# Patient Record
Sex: FEMALE | Race: WHITE | NOT HISPANIC OR LATINO | Employment: FULL TIME | URBAN - METROPOLITAN AREA
[De-identification: names, ages, dates, MRNs, and addresses within clinical notes are randomized per-mention and may not be internally consistent; named-entity substitution may affect disease eponyms.]

---

## 2017-02-06 ENCOUNTER — PATIENT MESSAGE (OUTPATIENT)
Dept: OBSTETRICS AND GYNECOLOGY | Facility: CLINIC | Age: 27
End: 2017-02-06

## 2017-03-12 ENCOUNTER — PATIENT MESSAGE (OUTPATIENT)
Dept: OBSTETRICS AND GYNECOLOGY | Facility: CLINIC | Age: 27
End: 2017-03-12

## 2017-05-10 ENCOUNTER — NURSE TRIAGE (OUTPATIENT)
Dept: ADMINISTRATIVE | Facility: CLINIC | Age: 27
End: 2017-05-10

## 2017-05-10 ENCOUNTER — TELEPHONE (OUTPATIENT)
Dept: OBSTETRICS AND GYNECOLOGY | Facility: CLINIC | Age: 27
End: 2017-05-10

## 2017-05-10 NOTE — TELEPHONE ENCOUNTER
Reason for Disposition   Normal menstrual flow    Protocols used: ST VAGINAL BLEEDING - [id number]  Ms. Ayala states she began her first period in 6 months after having her baby. She states bleeding is heavy. Patient is changing pad one pad every 1-2 hours. She would like to know if that is normal.

## 2018-07-30 ENCOUNTER — OFFICE VISIT (OUTPATIENT)
Dept: OBSTETRICS AND GYNECOLOGY | Facility: CLINIC | Age: 28
End: 2018-07-30
Payer: COMMERCIAL

## 2018-07-30 VITALS
BODY MASS INDEX: 35.68 KG/M2 | HEIGHT: 66 IN | WEIGHT: 222 LBS | DIASTOLIC BLOOD PRESSURE: 78 MMHG | SYSTOLIC BLOOD PRESSURE: 114 MMHG

## 2018-07-30 DIAGNOSIS — R19.8 RLQ FULLNESS: ICD-10-CM

## 2018-07-30 DIAGNOSIS — R10.2 FEMALE PELVIC PAIN: ICD-10-CM

## 2018-07-30 DIAGNOSIS — Z12.4 PAP SMEAR FOR CERVICAL CANCER SCREENING: Primary | ICD-10-CM

## 2018-07-30 PROCEDURE — 99395 PREV VISIT EST AGE 18-39: CPT | Mod: S$GLB,,, | Performed by: OBSTETRICS & GYNECOLOGY

## 2018-07-30 PROCEDURE — 99999 PR PBB SHADOW E&M-EST. PATIENT-LVL III: CPT | Mod: PBBFAC,,, | Performed by: OBSTETRICS & GYNECOLOGY

## 2018-07-30 PROCEDURE — 87624 HPV HI-RISK TYP POOLED RSLT: CPT

## 2018-07-30 PROCEDURE — 88175 CYTOPATH C/V AUTO FLUID REDO: CPT

## 2018-07-30 RX ORDER — AMLODIPINE BESYLATE 5 MG/1
TABLET ORAL
COMMUNITY
Start: 2018-03-01 | End: 2018-11-06

## 2018-07-30 NOTE — PROGRESS NOTES
Chief Complaint   Patient presents with    Annual Exam       History and Physical:  Patient's last menstrual period was 2018 (exact date).       MORGAN YAO is a 28 y.o.  female who presents today for her routine annual GYN exam. The patient has no Gynecology complaints today. rlq pains on and off and with intercourse, irregualr menses every 1-3 months. Counseled, trying to conceive, provera challenge.       Allergies:   Review of patient's allergies indicates:   Allergen Reactions    No known allergies        Past Medical History:   Diagnosis Date    Abnormal Pap smear     Asthma     allergy induced    Ovarian cyst     Seasonal allergies        Past Surgical History:   Procedure Laterality Date     SECTION         MEDS:   Current Outpatient Prescriptions on File Prior to Visit   Medication Sig Dispense Refill    albuterol (PROAIR HFA) 90 mcg/Actuation inhaler Every 4 hours PRN      PRENATAL VIT W-CA,FE,FA,<1 MG, (PRENATAL VITAMIN ORAL) Take by mouth.      [DISCONTINUED] blood sugar diagnostic Strp 1 strip by Misc.(Non-Drug; Combo Route) route 4 (four) times daily. 100 each 3    [DISCONTINUED] blood-glucose meter (FREESTYLE SYSTEM KIT) kit Use as instructed 1 each 0    [DISCONTINUED] lancets (FREESTYLE LANCETS) 28 gauge Misc 1 lancet by Misc.(Non-Drug; Combo Route) route 4 (four) times daily. 1000 each 3    [DISCONTINUED] methyldopa (ALDOMET) 250 MG tablet Take 1 tablet (250 mg total) by mouth 3 (three) times daily. 90 tablet 2    [DISCONTINUED] norethindrone (MICRONOR) 0.35 mg tablet Take 1 tablet (0.35 mg total) by mouth once daily. 28 tablet 12     No current facility-administered medications on file prior to visit.        OB History      Para Term  AB Living    1 1 1     1    SAB TAB Ectopic Multiple Live Births            1          Social History     Social History    Marital status: Single     Spouse name: N/A    Number of children: N/A    Years of  "education: N/A     Occupational History    Not on file.     Social History Main Topics    Smoking status: Never Smoker    Smokeless tobacco: Never Used    Alcohol use No      Comment: socially     Drug use: No    Sexual activity: Yes     Partners: Male     Other Topics Concern    Not on file     Social History Narrative    No narrative on file       Family History   Problem Relation Age of Onset    Diabetes Paternal Grandmother     Heart murmur Maternal Grandfather     Gestational diabetes Mother     Breast cancer Maternal Aunt     Breast cancer Maternal Grandmother          Past medical and surgical history reviewed.   I have reviewed the patient's medical history in detail and updated the computerized patient record.        Review of System:   General: no chills, fever, night sweats, weight gain or weight loss  Psychological: no depression or suicidal ideation  Breasts: no new or changing breast lumps, nipple discharge or masses.  Respiratory: no cough, shortness of breath, or wheezing  Cardiovascular: no chest pain or dyspnea on exertion  Gastrointestinal: no abdominal pain, change in bowel habits, or black or bloody stools  Genito-Urinary: no incontinence, urinary frequency/urgency or vulvar/vaginal symptoms.   Musculoskeletal: no gait disturbance or muscular weakness      Physical Exam:   /78   Ht 5' 6" (1.676 m)   Wt 100.7 kg (222 lb 0.1 oz)   LMP 06/24/2018 (Exact Date)   Breastfeeding? No   BMI 35.83 kg/m²   Constitutional: She is oriented to person, place, and time. She appears well-developed and well-nourished. No distress.   HENT:   Head: Normocephalic and atraumatic.   Eyes: Conjunctivae and EOM are normal. No scleral icterus.   Neck: Normal range of motion. Neck supple. No tracheal deviation present.   Cardiovascular: Normal rate.    Pulmonary/Chest: Effort normal. No respiratory distress. She exhibits no tenderness.  Breasts: are symmetrical.   Right breast exhibits no inverted " nipple, no mass, no nipple discharge, no skin change and no tenderness.   Left breast exhibits no inverted nipple, no mass, no nipple discharge, no skin change and no tenderness.  Abdominal: Soft. She exhibits no distension and no mass. There is no tenderness. There is no rebound and no guarding.   Genitourinary:    External rectal exam shows no thrombosed external hemorrhoids.    Pelvic exam was performed with patient supine.   No labial fusion.   There is no rash, lesion or injury on the right labia.   There is no rash, lesion or injury on the left labia.   No bleeding and no signs of injury around the vaginal introitus, urethra is without lesions and well supported. The cervix is visualized with no discharge, lesions or friability.   No vaginal discharge found.    No significant Cystocele, Enterocele or rectocele, and uterus well supported.   Bimanual exam:   The urethra is normal to palpation and there are no palpable vaginal wall masses.   Uterus is not deviated, not enlarged, not fixed, normal shape and not tender.   Cervix exhibits no motion tenderness.    Right adnexum displays no mass and fullness.   Left adnexum displays no mass and no tenderness.  Musculoskeletal: Normal range of motion.   Lymphadenopathy: No inguinal adenopathy present.   Neurological: She is alert and oriented to person, place, and time. Coordination normal.   Skin: Skin is warm and dry. She is not diaphoretic.   Psychiatric: She has a normal mood and affect.      Assessment:   Normal annual GYN exam  1. Pap smear for cervical cancer screening  Liquid-based pap smear, screening    HPV High Risk Genotypes, PCR   2. RLQ fullness  US Pelvis Complete Non OB   3. Female pelvic pain  US Pelvis Complete Non OB   RLQ pains and fullness,  Irregular menses    Plan:   PAP, ultrasound  Provera challenge.   Follow up in 3 months if continued problems.  Patient informed will be contacted with results within 2 weeks. Encouraged to please call back or  email if she has not heard from us by then.

## 2018-08-01 ENCOUNTER — TELEPHONE (OUTPATIENT)
Dept: OBSTETRICS AND GYNECOLOGY | Facility: CLINIC | Age: 28
End: 2018-08-01

## 2018-08-01 ENCOUNTER — HOSPITAL ENCOUNTER (OUTPATIENT)
Dept: RADIOLOGY | Facility: HOSPITAL | Age: 28
Discharge: HOME | End: 2018-08-01
Attending: OBSTETRICS & GYNECOLOGY
Payer: COMMERCIAL

## 2018-08-01 DIAGNOSIS — R19.8 RLQ FULLNESS: ICD-10-CM

## 2018-08-01 DIAGNOSIS — R10.2 FEMALE PELVIC PAIN: ICD-10-CM

## 2018-08-01 PROCEDURE — 76856 US EXAM PELVIC COMPLETE: CPT | Mod: 26,,, | Performed by: RADIOLOGY

## 2018-08-01 PROCEDURE — 76830 TRANSVAGINAL US NON-OB: CPT | Mod: 26,,, | Performed by: RADIOLOGY

## 2018-08-01 PROCEDURE — 76830 TRANSVAGINAL US NON-OB: CPT | Mod: TC,PN

## 2018-08-01 NOTE — TELEPHONE ENCOUNTER
----- Message from Sunshine Knox sent at 8/1/2018 11:23 AM CDT -----  Contact: self  Patient is scheduled at 1PM for a vaginal ultrasound and just started her cycle and wants to know if she can still have the ultrasound.  Call back at [phone].  Thank you!

## 2018-08-03 LAB
HPV HR 12 DNA CVX QL NAA+PROBE: NEGATIVE
HPV16 AG SPEC QL: NEGATIVE
HPV18 DNA SPEC QL NAA+PROBE: NEGATIVE

## 2018-11-03 ENCOUNTER — PATIENT MESSAGE (OUTPATIENT)
Dept: OBSTETRICS AND GYNECOLOGY | Facility: CLINIC | Age: 28
End: 2018-11-03

## 2018-11-06 ENCOUNTER — LAB VISIT (OUTPATIENT)
Dept: LAB | Facility: HOSPITAL | Age: 28
End: 2018-11-06
Attending: OBSTETRICS & GYNECOLOGY
Payer: COMMERCIAL

## 2018-11-06 ENCOUNTER — OFFICE VISIT (OUTPATIENT)
Dept: OBSTETRICS AND GYNECOLOGY | Facility: CLINIC | Age: 28
End: 2018-11-06
Payer: COMMERCIAL

## 2018-11-06 VITALS
WEIGHT: 224.19 LBS | BODY MASS INDEX: 36.03 KG/M2 | SYSTOLIC BLOOD PRESSURE: 124 MMHG | DIASTOLIC BLOOD PRESSURE: 84 MMHG | HEIGHT: 66 IN

## 2018-11-06 DIAGNOSIS — N91.2 ABSENT MENSES: Primary | ICD-10-CM

## 2018-11-06 DIAGNOSIS — N91.2 ABSENT MENSES: ICD-10-CM

## 2018-11-06 DIAGNOSIS — Z34.90 PREGNANCY AT EARLY STAGE: ICD-10-CM

## 2018-11-06 LAB
B-HCG UR QL: POSITIVE
CTP QC/QA: YES
HCG INTACT+B SERPL-ACNC: 181 MIU/ML
PROGEST SERPL-MCNC: 13.3 NG/ML

## 2018-11-06 PROCEDURE — 99213 OFFICE O/P EST LOW 20 MIN: CPT | Mod: S$GLB,,, | Performed by: OBSTETRICS & GYNECOLOGY

## 2018-11-06 PROCEDURE — 3008F BODY MASS INDEX DOCD: CPT | Mod: CPTII,S$GLB,, | Performed by: OBSTETRICS & GYNECOLOGY

## 2018-11-06 PROCEDURE — 99999 PR PBB SHADOW E&M-EST. PATIENT-LVL III: CPT | Mod: PBBFAC,,, | Performed by: OBSTETRICS & GYNECOLOGY

## 2018-11-06 PROCEDURE — 84702 CHORIONIC GONADOTROPIN TEST: CPT

## 2018-11-06 PROCEDURE — 84144 ASSAY OF PROGESTERONE: CPT

## 2018-11-06 PROCEDURE — 81025 URINE PREGNANCY TEST: CPT | Mod: S$GLB,,, | Performed by: OBSTETRICS & GYNECOLOGY

## 2018-11-06 PROCEDURE — 36415 COLL VENOUS BLD VENIPUNCTURE: CPT | Mod: PO

## 2018-11-06 RX ORDER — AMLODIPINE AND BENAZEPRIL HYDROCHLORIDE 5; 10 MG/1; MG/1
1 CAPSULE ORAL DAILY
COMMUNITY
End: 2018-11-21

## 2018-11-06 RX ORDER — METHYLDOPA 250 MG/1
250 TABLET, FILM COATED ORAL EVERY 12 HOURS
Qty: 60 TABLET | Refills: 6 | Status: SHIPPED | OUTPATIENT
Start: 2018-11-06 | End: 2019-05-17 | Stop reason: SDUPTHER

## 2018-11-06 NOTE — PROGRESS NOTES
"Chief Complaint   Patient presents with    confirm pregnancy       History of Present Illness   28 y.o.  female patient presents today for early pregnancy, faint positive UPT - counseled. Early pregnancy rpecautions discussed.     Past medical and surgical history reviewed.   I have reviewed the patient's medical history in detail and updated the computerized patient record.    Review of patient's allergies indicates:   Allergen Reactions    No known allergies          Review of Systems - Negative except HPI  GEN ROS: negative for - chills or fever  Breast ROS: negative for breast lumps  Genito-Urinary ROS: no dysuria, trouble voiding, or hematuria      Physical Examination:  /84   Ht 5' 6" (1.676 m)   Wt 101.7 kg (224 lb 3.3 oz)   LMP 09/21/2018 (Exact Date)   Breastfeeding? Yes   BMI 36.19 kg/m²    deferred      Assessment:  htn - need to cage meds  1. Absent menses  POCT urine pregnancy    hCG, quantitative    Progesterone   2. Pregnancy at early stage         [facility]  Early ob labs, follow up 2 weeks for u.s to confirm viability.   Patient informed will be contacted with results within 2 weeks. Encouraged to please call back or email if she has not heard from us by then.        "

## 2018-11-07 ENCOUNTER — PATIENT MESSAGE (OUTPATIENT)
Dept: OBSTETRICS AND GYNECOLOGY | Facility: CLINIC | Age: 28
End: 2018-11-07

## 2018-11-07 DIAGNOSIS — Z34.90 EARLY STAGE OF PREGNANCY: Primary | ICD-10-CM

## 2018-11-08 ENCOUNTER — LAB VISIT (OUTPATIENT)
Dept: LAB | Facility: HOSPITAL | Age: 28
End: 2018-11-08
Attending: OBSTETRICS & GYNECOLOGY
Payer: COMMERCIAL

## 2018-11-08 DIAGNOSIS — Z34.90 EARLY STAGE OF PREGNANCY: ICD-10-CM

## 2018-11-08 LAB — HCG INTACT+B SERPL-ACNC: 319 MIU/ML

## 2018-11-08 PROCEDURE — 84702 CHORIONIC GONADOTROPIN TEST: CPT

## 2018-11-08 PROCEDURE — 36415 COLL VENOUS BLD VENIPUNCTURE: CPT | Mod: PO

## 2018-11-09 ENCOUNTER — PATIENT MESSAGE (OUTPATIENT)
Dept: OBSTETRICS AND GYNECOLOGY | Facility: CLINIC | Age: 28
End: 2018-11-09

## 2018-11-21 ENCOUNTER — ROUTINE PRENATAL (OUTPATIENT)
Dept: OBSTETRICS AND GYNECOLOGY | Facility: CLINIC | Age: 28
End: 2018-11-21
Payer: COMMERCIAL

## 2018-11-21 VITALS
WEIGHT: 226.88 LBS | DIASTOLIC BLOOD PRESSURE: 74 MMHG | BODY MASS INDEX: 36.62 KG/M2 | SYSTOLIC BLOOD PRESSURE: 130 MMHG

## 2018-11-21 DIAGNOSIS — Z34.90 EARLY STAGE OF PREGNANCY: Primary | ICD-10-CM

## 2018-11-21 DIAGNOSIS — O36.8390 UNABLE TO HEAR FETAL HEART TONES AS REASON FOR ULTRASOUND SCAN: ICD-10-CM

## 2018-11-21 PROCEDURE — 87491 CHLMYD TRACH DNA AMP PROBE: CPT

## 2018-11-21 PROCEDURE — 76817 TRANSVAGINAL US OBSTETRIC: CPT | Mod: S$GLB,,, | Performed by: OBSTETRICS & GYNECOLOGY

## 2018-11-21 PROCEDURE — 99999 PR PBB SHADOW E&M-EST. PATIENT-LVL III: CPT | Mod: PBBFAC,,, | Performed by: OBSTETRICS & GYNECOLOGY

## 2018-11-21 PROCEDURE — 0502F SUBSEQUENT PRENATAL CARE: CPT | Mod: S$GLB,,, | Performed by: OBSTETRICS & GYNECOLOGY

## 2018-11-21 PROCEDURE — 3008F BODY MASS INDEX DOCD: CPT | Mod: CPTII,S$GLB,, | Performed by: OBSTETRICS & GYNECOLOGY

## 2018-11-21 PROCEDURE — 87086 URINE CULTURE/COLONY COUNT: CPT

## 2018-11-21 NOTE — PROCEDURES
Procedures     ULTRASOUND:   Bedside Ultrasound Findings      Narrative     Ultrasound transvaginal performed with the 6.5mhz probe in the usual fashion.      The uterus appears normal.      Viable parikh intrauterine pregnancy was seen, yolk sac was seen  Crown-rump length = 4 mm with flicker, consistent with 6w1d and EDC 07/16/2019.   The right ovary is not visualized, and the left ovary is not visualized.   No free fluid in cul-de-sac or adnexal pathology seen      Impression       1.  single viable intrauterine pregnancy seen today  2. CRL is consistent with 6w1d today

## 2018-11-21 NOTE — PROGRESS NOTES
Complaints today: mild cramps, unable to hear FHT with doppler. Irregular cycles, positive UPT    /74   Wt 102.9 kg (226 lb 13.7 oz)   LMP 2018 (Exact Date)   BMI 36.62 kg/m²     28 y.o., at 8w5d by Estimated Date of Delivery: 19  Patient Active Problem List   Diagnosis    ASCUS (atypical squamous cells of undetermined significance) on Pap smear    Encounter for supervision of normal first pregnancy in first trimester    Diet controlled gestational diabetes mellitus in third trimester     OB History    Para Term  AB Living   2 1 1     1   SAB TAB Ectopic Multiple Live Births           1      # Outcome Date GA Lbr Kelby/2nd Weight Sex Delivery Anes PTL Lv   2 Current            1 Term 16 40w0d  3.912 kg (8 lb 10 oz) F CS-LTranv EPI N JEFFREY      Complications: Failure to Progress in First Stage          Dating reviewed    Allergies and problem list reviewed and updated    Medical and surgical history reviewed    Prenatal labs reviewed and updated    Physical Exam:  ABD: soft, gravid, nontender    Assessment:  Early pregnancy- u/s for dates    Plan:    follow up 3 Weeks, bleeding/pain precautions, repeat u/s for viability at follow up.

## 2018-11-22 LAB
C TRACH DNA SPEC QL NAA+PROBE: NOT DETECTED
N GONORRHOEA DNA SPEC QL NAA+PROBE: NOT DETECTED

## 2018-11-23 LAB
BACTERIA UR CULT: NORMAL
BACTERIA UR CULT: NORMAL

## 2018-12-10 ENCOUNTER — PATIENT MESSAGE (OUTPATIENT)
Dept: OBSTETRICS AND GYNECOLOGY | Facility: CLINIC | Age: 28
End: 2018-12-10

## 2018-12-12 ENCOUNTER — PATIENT MESSAGE (OUTPATIENT)
Dept: OBSTETRICS AND GYNECOLOGY | Facility: CLINIC | Age: 28
End: 2018-12-12

## 2018-12-12 ENCOUNTER — ROUTINE PRENATAL (OUTPATIENT)
Dept: OBSTETRICS AND GYNECOLOGY | Facility: CLINIC | Age: 28
End: 2018-12-12
Payer: COMMERCIAL

## 2018-12-12 VITALS
BODY MASS INDEX: 36.83 KG/M2 | DIASTOLIC BLOOD PRESSURE: 72 MMHG | SYSTOLIC BLOOD PRESSURE: 120 MMHG | WEIGHT: 228.19 LBS

## 2018-12-12 DIAGNOSIS — Z3A.11 11 WEEKS GESTATION OF PREGNANCY: Primary | ICD-10-CM

## 2018-12-12 DIAGNOSIS — O36.8390 UNABLE TO HEAR FETAL HEART TONES AS REASON FOR ULTRASOUND SCAN: ICD-10-CM

## 2018-12-12 DIAGNOSIS — Z34.90 EARLY STAGE OF PREGNANCY: ICD-10-CM

## 2018-12-12 PROCEDURE — 3008F BODY MASS INDEX DOCD: CPT | Mod: CPTII,S$GLB,, | Performed by: OBSTETRICS & GYNECOLOGY

## 2018-12-12 PROCEDURE — 99213 OFFICE O/P EST LOW 20 MIN: CPT | Mod: 25,S$GLB,, | Performed by: OBSTETRICS & GYNECOLOGY

## 2018-12-12 PROCEDURE — 99999 PR PBB SHADOW E&M-EST. PATIENT-LVL II: CPT | Mod: PBBFAC,,, | Performed by: OBSTETRICS & GYNECOLOGY

## 2018-12-12 PROCEDURE — 76817 TRANSVAGINAL US OBSTETRIC: CPT | Mod: S$GLB,,, | Performed by: OBSTETRICS & GYNECOLOGY

## 2018-12-12 NOTE — PROGRESS NOTES
Complaints today: none    /72   Wt 103.5 kg (228 lb 2.8 oz)   LMP 2018 (Exact Date)   BMI 36.83 kg/m²     28 y.o., at 11w5d by Estimated Date of Delivery: 19  Patient Active Problem List   Diagnosis    ASCUS (atypical squamous cells of undetermined significance) on Pap smear    Encounter for supervision of normal first pregnancy in first trimester    Diet controlled gestational diabetes mellitus in third trimester     OB History    Para Term  AB Living   2 1 1     1   SAB TAB Ectopic Multiple Live Births           1      # Outcome Date GA Lbr Kelby/2nd Weight Sex Delivery Anes PTL Lv   2 Current            1 Term 16 40w0d  3.912 kg (8 lb 10 oz) F CS-LTranv EPI N JEFFREY      Complications: Failure to Progress in First Stage          Dating reviewed    Allergies and problem list reviewed and updated    Medical and surgical history reviewed    Prenatal labs reviewed and updated    Physical Exam:  ABD: soft, gravid, nontender  Unable to hear FHT with doppler    Assessment:  9 weeks, u/s todaydoing well    Plan:    follow up 3 Weeks, bleeding/pain precautions prenatal labs today.

## 2018-12-12 NOTE — PROCEDURES
Procedures     ULTRASOUND:   Bedside Ultrasound Findings      Narrative     Ultrasound transvaginal performed with the 6.5mhz probe in the usual fashion.      The uterus appears normal.      Viable parikh intrauterine pregnancy was seen, yolk sac was seen  Crown-rump length = 26mm mm with flicker, consistent with 9w4d and EDC 07/13/2019.   The right ovary is not visualized, and the left ovary is not visualized.     No free fluid in cul-de-sac or adnexal pathology seen      Impression       1.  normal single viable intrauterine pregnancy  2. Crown rump length consistent with 9w4d today  3. 2 weeks aerlier than reported menstrual dates.

## 2018-12-13 ENCOUNTER — PATIENT MESSAGE (OUTPATIENT)
Dept: OBSTETRICS AND GYNECOLOGY | Facility: CLINIC | Age: 28
End: 2018-12-13

## 2018-12-27 ENCOUNTER — LAB VISIT (OUTPATIENT)
Dept: LAB | Facility: HOSPITAL | Age: 28
End: 2018-12-27
Attending: OBSTETRICS & GYNECOLOGY
Payer: COMMERCIAL

## 2018-12-27 DIAGNOSIS — Z34.90 EARLY STAGE OF PREGNANCY: ICD-10-CM

## 2018-12-27 LAB
ABO + RH BLD: NORMAL
BASOPHILS # BLD AUTO: 0.07 K/UL
BASOPHILS NFR BLD: 0.8 %
BLD GP AB SCN CELLS X3 SERPL QL: NORMAL
DIFFERENTIAL METHOD: ABNORMAL
EOSINOPHIL # BLD AUTO: 0.3 K/UL
EOSINOPHIL NFR BLD: 3.9 %
ERYTHROCYTE [DISTWIDTH] IN BLOOD BY AUTOMATED COUNT: 13.2 %
HCT VFR BLD AUTO: 40.9 %
HGB BLD-MCNC: 12.8 G/DL
IMM GRANULOCYTES # BLD AUTO: 0.04 K/UL
IMM GRANULOCYTES NFR BLD AUTO: 0.5 %
LYMPHOCYTES # BLD AUTO: 1.4 K/UL
LYMPHOCYTES NFR BLD: 17 %
MCH RBC QN AUTO: 27.8 PG
MCHC RBC AUTO-ENTMCNC: 31.3 G/DL
MCV RBC AUTO: 89 FL
MONOCYTES # BLD AUTO: 0.3 K/UL
MONOCYTES NFR BLD: 3.1 %
NEUTROPHILS # BLD AUTO: 6.3 K/UL
NEUTROPHILS NFR BLD: 74.7 %
NRBC BLD-RTO: 0 /100 WBC
PLATELET # BLD AUTO: 314 K/UL
PMV BLD AUTO: 10.7 FL
RBC # BLD AUTO: 4.6 M/UL
TSH SERPL DL<=0.005 MIU/L-ACNC: 1 UIU/ML
WBC # BLD AUTO: 8.47 K/UL

## 2018-12-27 PROCEDURE — 86762 RUBELLA ANTIBODY: CPT

## 2018-12-27 PROCEDURE — 86703 HIV-1/HIV-2 1 RESULT ANTBDY: CPT

## 2018-12-27 PROCEDURE — 85025 COMPLETE CBC W/AUTO DIFF WBC: CPT

## 2018-12-27 PROCEDURE — 87340 HEPATITIS B SURFACE AG IA: CPT

## 2018-12-27 PROCEDURE — 86592 SYPHILIS TEST NON-TREP QUAL: CPT

## 2018-12-27 PROCEDURE — 84443 ASSAY THYROID STIM HORMONE: CPT

## 2018-12-27 PROCEDURE — 86850 RBC ANTIBODY SCREEN: CPT

## 2018-12-27 PROCEDURE — 36415 COLL VENOUS BLD VENIPUNCTURE: CPT | Mod: PO

## 2018-12-28 LAB
HBV SURFACE AG SERPL QL IA: NEGATIVE
HIV 1+2 AB+HIV1 P24 AG SERPL QL IA: NEGATIVE
RPR SER QL: NORMAL
RUBV IGG SER-ACNC: 16.1 IU/ML
RUBV IGG SER-IMP: REACTIVE

## 2019-01-02 ENCOUNTER — ROUTINE PRENATAL (OUTPATIENT)
Dept: OBSTETRICS AND GYNECOLOGY | Facility: CLINIC | Age: 29
End: 2019-01-02
Attending: OBSTETRICS & GYNECOLOGY
Payer: COMMERCIAL

## 2019-01-02 VITALS
SYSTOLIC BLOOD PRESSURE: 122 MMHG | BODY MASS INDEX: 36.76 KG/M2 | WEIGHT: 227.75 LBS | DIASTOLIC BLOOD PRESSURE: 66 MMHG

## 2019-01-02 DIAGNOSIS — Z3A.14 14 WEEKS GESTATION OF PREGNANCY: Primary | ICD-10-CM

## 2019-01-02 LAB
BILIRUB SERPL-MCNC: NORMAL MG/DL
BLOOD URINE, POC: NORMAL
COLOR, POC UA: YELLOW
GLUCOSE UR QL STRIP: NORMAL
KETONES UR QL STRIP: NORMAL
LEUKOCYTE ESTERASE URINE, POC: NORMAL
NITRITE, POC UA: NORMAL
PH, POC UA: NORMAL
PROTEIN, POC: NORMAL
SPECIFIC GRAVITY, POC UA: 1.01
UROBILINOGEN, POC UA: NORMAL

## 2019-01-02 PROCEDURE — 0502F PR SUBSEQUENT PRENATAL CARE: ICD-10-PCS | Mod: S$GLB,,, | Performed by: OBSTETRICS & GYNECOLOGY

## 2019-01-02 PROCEDURE — 0502F SUBSEQUENT PRENATAL CARE: CPT | Mod: S$GLB,,, | Performed by: OBSTETRICS & GYNECOLOGY

## 2019-01-02 PROCEDURE — 99999 PR PBB SHADOW E&M-EST. PATIENT-LVL III: ICD-10-PCS | Mod: PBBFAC,,, | Performed by: OBSTETRICS & GYNECOLOGY

## 2019-01-02 PROCEDURE — 99999 PR PBB SHADOW E&M-EST. PATIENT-LVL III: CPT | Mod: PBBFAC,,, | Performed by: OBSTETRICS & GYNECOLOGY

## 2019-01-02 NOTE — PROGRESS NOTES
Complaints today: none    /66   Wt 103.3 kg (227 lb 11.8 oz)   LMP 2018 (Exact Date)   BMI 36.76 kg/m²     28 y.o., at 12w4d by Estimated Date of Delivery: 19  Patient Active Problem List   Diagnosis    ASCUS (atypical squamous cells of undetermined significance) on Pap smear    Encounter for supervision of normal first pregnancy in first trimester    Diet controlled gestational diabetes mellitus in third trimester     OB History    Para Term  AB Living   2 1 1     1   SAB TAB Ectopic Multiple Live Births           1      # Outcome Date GA Lbr Kelby/2nd Weight Sex Delivery Anes PTL Lv   2 Current            1 Term 16 40w0d  3.912 kg (8 lb 10 oz) F CS-LTranv EPI N JEFFREY      Complications: Failure to Progress in First Stage          Dating reviewed    Allergies and problem list reviewed and updated    Medical and surgical history reviewed    Prenatal labs reviewed and updated    Physical Exam:  ABD: soft, gravid, nontender    Assessment:  12 weeks, doing well  FHT with u/s today    Plan:   Counseled quad screen   follow up 4 Weeks, bleeding/pain precautions

## 2019-01-29 ENCOUNTER — PATIENT MESSAGE (OUTPATIENT)
Dept: OBSTETRICS AND GYNECOLOGY | Facility: CLINIC | Age: 29
End: 2019-01-29

## 2019-01-30 ENCOUNTER — ROUTINE PRENATAL (OUTPATIENT)
Dept: OBSTETRICS AND GYNECOLOGY | Facility: CLINIC | Age: 29
End: 2019-01-30
Attending: OBSTETRICS & GYNECOLOGY
Payer: COMMERCIAL

## 2019-01-30 ENCOUNTER — LAB VISIT (OUTPATIENT)
Dept: LAB | Facility: HOSPITAL | Age: 29
End: 2019-01-30
Attending: OBSTETRICS & GYNECOLOGY
Payer: COMMERCIAL

## 2019-01-30 VITALS
WEIGHT: 233.69 LBS | BODY MASS INDEX: 37.72 KG/M2 | SYSTOLIC BLOOD PRESSURE: 142 MMHG | DIASTOLIC BLOOD PRESSURE: 56 MMHG

## 2019-01-30 DIAGNOSIS — Z3A.16 16 WEEKS GESTATION OF PREGNANCY: Primary | ICD-10-CM

## 2019-01-30 DIAGNOSIS — Z3A.16 16 WEEKS GESTATION OF PREGNANCY: ICD-10-CM

## 2019-01-30 LAB
BILIRUB SERPL-MCNC: NORMAL MG/DL
BLOOD URINE, POC: NORMAL
COLOR, POC UA: YELLOW
GLUCOSE UR QL STRIP: NORMAL
KETONES UR QL STRIP: NORMAL
LEUKOCYTE ESTERASE URINE, POC: NORMAL
NITRITE, POC UA: NORMAL
PH, POC UA: 7
PROTEIN, POC: NORMAL
SPECIFIC GRAVITY, POC UA: NORMAL
UROBILINOGEN, POC UA: NORMAL

## 2019-01-30 PROCEDURE — 99999 PR PBB SHADOW E&M-EST. PATIENT-LVL II: CPT | Mod: PBBFAC,,, | Performed by: OBSTETRICS & GYNECOLOGY

## 2019-01-30 PROCEDURE — 0502F PR SUBSEQUENT PRENATAL CARE: ICD-10-PCS | Mod: S$GLB,,, | Performed by: OBSTETRICS & GYNECOLOGY

## 2019-01-30 PROCEDURE — 0502F SUBSEQUENT PRENATAL CARE: CPT | Mod: S$GLB,,, | Performed by: OBSTETRICS & GYNECOLOGY

## 2019-01-30 PROCEDURE — 36415 COLL VENOUS BLD VENIPUNCTURE: CPT | Mod: PO

## 2019-01-30 PROCEDURE — 99999 PR PBB SHADOW E&M-EST. PATIENT-LVL II: ICD-10-PCS | Mod: PBBFAC,,, | Performed by: OBSTETRICS & GYNECOLOGY

## 2019-01-30 PROCEDURE — 81511 FTL CGEN ABNOR FOUR ANAL: CPT

## 2019-01-30 NOTE — PROGRESS NOTES
Complaints today: none, BPs at home 130s / 60s on aldomet BID  Recommend increase to TID aldomet and start baby ASA.     BP (!) 180/78   Wt 106 kg (233 lb 11 oz)   LMP 2018 (Exact Date)   BMI 37.72 kg/m²     28 y.o., at 16w4d by Estimated Date of Delivery: 19  Patient Active Problem List   Diagnosis    ASCUS (atypical squamous cells of undetermined significance) on Pap smear    Encounter for supervision of normal first pregnancy in first trimester    Diet controlled gestational diabetes mellitus in third trimester     OB History    Para Term  AB Living   2 1 1     1   SAB TAB Ectopic Multiple Live Births           1      # Outcome Date GA Lbr Kelby/2nd Weight Sex Delivery Anes PTL Lv   2 Current            1 Term 16 40w0d  3.912 kg (8 lb 10 oz) F CS-LTranv EPI N JEFFREY      Complications: Failure to Progress in First Stage          Dating reviewed    Allergies and problem list reviewed and updated    Medical and surgical history reviewed    Prenatal labs reviewed and updated    Physical Exam:  ABD: soft, gravid, nontender    Assessment:  16 weeks, doing well on aldomet - elevated BPS      PLAN:  Increase to TID 250mg and baby ASA  U/s at follow up  Quad screen today    follow up 3 Weeks, bleeding/pain , HTN precautions.

## 2019-02-04 LAB
# FETUSES US: NORMAL
2ND TRIMESTER 4 SCREEN PNL SERPL: NEGATIVE
2ND TRIMESTER 4 SCREEN SERPL-IMP: NORMAL
AFP MOM SERPL: 0.99
AFP SERPL-MCNC: 26.6 NG/ML
AGE AT DELIVERY: 31
B-HCG MOM SERPL: 1.15
B-HCG SERPL-ACNC: 27.4 IU/ML
FET TS 21 RISK FROM MAT AGE: NORMAL
GA (DAYS): 4 D
GA (WEEKS): 16 WK
GA METHOD: NORMAL
IDDM PATIENT QL: NORMAL
INHIBIN A MOM SERPL: 1.18
INHIBIN A SERPL-MCNC: 154.2 PG/ML
SMOKING STATUS FTND: NO
TS 18 RISK FETUS: NORMAL
TS 21 RISK FETUS: NORMAL
U ESTRIOL MOM SERPL: 0.71
U ESTRIOL SERPL-MCNC: 0.57 NG/ML

## 2019-02-20 ENCOUNTER — ROUTINE PRENATAL (OUTPATIENT)
Dept: OBSTETRICS AND GYNECOLOGY | Facility: CLINIC | Age: 29
End: 2019-02-20
Payer: COMMERCIAL

## 2019-02-20 ENCOUNTER — HOSPITAL ENCOUNTER (OUTPATIENT)
Dept: RADIOLOGY | Facility: HOSPITAL | Age: 29
Discharge: HOME | End: 2019-02-20
Attending: OBSTETRICS & GYNECOLOGY
Payer: COMMERCIAL

## 2019-02-20 VITALS — SYSTOLIC BLOOD PRESSURE: 134 MMHG | BODY MASS INDEX: 37.9 KG/M2 | WEIGHT: 234.81 LBS | DIASTOLIC BLOOD PRESSURE: 80 MMHG

## 2019-02-20 DIAGNOSIS — Z3A.19 19 WEEKS GESTATION OF PREGNANCY: Primary | ICD-10-CM

## 2019-02-20 DIAGNOSIS — Z3A.14 14 WEEKS GESTATION OF PREGNANCY: ICD-10-CM

## 2019-02-20 LAB
BILIRUB SERPL-MCNC: NORMAL MG/DL
BLOOD URINE, POC: NORMAL
COLOR, POC UA: NORMAL
GLUCOSE UR QL STRIP: NORMAL
KETONES UR QL STRIP: NORMAL
LEUKOCYTE ESTERASE URINE, POC: NORMAL
NITRITE, POC UA: NORMAL
PH, POC UA: 7
PROTEIN, POC: NORMAL
SPECIFIC GRAVITY, POC UA: NORMAL
UROBILINOGEN, POC UA: NORMAL

## 2019-02-20 PROCEDURE — 76805 US OB GREATER THAN 14 WEEKS FIRST GESTATION: ICD-10-PCS | Mod: 26,,, | Performed by: RADIOLOGY

## 2019-02-20 PROCEDURE — 99999 PR PBB SHADOW E&M-EST. PATIENT-LVL II: ICD-10-PCS | Mod: PBBFAC,,, | Performed by: OBSTETRICS & GYNECOLOGY

## 2019-02-20 PROCEDURE — 76805 OB US >/= 14 WKS SNGL FETUS: CPT | Mod: TC,PN

## 2019-02-20 PROCEDURE — 0502F SUBSEQUENT PRENATAL CARE: CPT | Mod: CPTII,S$GLB,, | Performed by: OBSTETRICS & GYNECOLOGY

## 2019-02-20 PROCEDURE — 0502F PR SUBSEQUENT PRENATAL CARE: ICD-10-PCS | Mod: CPTII,S$GLB,, | Performed by: OBSTETRICS & GYNECOLOGY

## 2019-02-20 PROCEDURE — 76805 OB US >/= 14 WKS SNGL FETUS: CPT | Mod: 26,,, | Performed by: RADIOLOGY

## 2019-02-20 PROCEDURE — 99999 PR PBB SHADOW E&M-EST. PATIENT-LVL II: CPT | Mod: PBBFAC,,, | Performed by: OBSTETRICS & GYNECOLOGY

## 2019-02-20 RX ORDER — ASPIRIN 81 MG/1
81 TABLET ORAL DAILY
Qty: 90 TABLET | Refills: 2 | Status: SHIPPED | OUTPATIENT
Start: 2019-02-20 | End: 2019-08-16

## 2019-02-20 NOTE — PROGRESS NOTES
Complaints today: none    /80   Wt 106.5 kg (234 lb 12.6 oz)   LMP 2018 (Exact Date)   BMI 37.90 kg/m²     28 y.o., at 19w4d by Estimated Date of Delivery: 19  Patient Active Problem List   Diagnosis    ASCUS (atypical squamous cells of undetermined significance) on Pap smear    Encounter for supervision of normal first pregnancy in first trimester    Diet controlled gestational diabetes mellitus in third trimester     OB History    Para Term  AB Living   2 1 1     1   SAB TAB Ectopic Multiple Live Births           1      # Outcome Date GA Lbr Kelby/2nd Weight Sex Delivery Anes PTL Lv   2 Current            1 Term 16 40w0d  3.912 kg (8 lb 10 oz) F CS-LTranv EPI N JEFFREY      Complications: Failure to Progress in First Stage          Dating reviewed    Allergies and problem list reviewed and updated    Medical and surgical history reviewed    Prenatal labs reviewed and updated    Physical Exam:  ABD: soft, gravid, nontender    Assessment:  19 weeks, doing well    Plan:    follow up 3 Weeks, bleeding/pain precautions  Baby ASA one QD.

## 2019-03-06 ENCOUNTER — PATIENT MESSAGE (OUTPATIENT)
Dept: OBSTETRICS AND GYNECOLOGY | Facility: CLINIC | Age: 29
End: 2019-03-06

## 2019-03-13 ENCOUNTER — ROUTINE PRENATAL (OUTPATIENT)
Dept: OBSTETRICS AND GYNECOLOGY | Facility: CLINIC | Age: 29
End: 2019-03-13
Payer: COMMERCIAL

## 2019-03-13 VITALS — DIASTOLIC BLOOD PRESSURE: 82 MMHG | BODY MASS INDEX: 38.5 KG/M2 | SYSTOLIC BLOOD PRESSURE: 134 MMHG | WEIGHT: 238.56 LBS

## 2019-03-13 DIAGNOSIS — Z3A.22 22 WEEKS GESTATION OF PREGNANCY: Primary | ICD-10-CM

## 2019-03-13 LAB
BILIRUB SERPL-MCNC: NEGATIVE MG/DL
BLOOD URINE, POC: NEGATIVE
COLOR, POC UA: NORMAL
GLUCOSE UR QL STRIP: NORMAL
KETONES UR QL STRIP: NEGATIVE
LEUKOCYTE ESTERASE URINE, POC: NEGATIVE
NITRITE, POC UA: NEGATIVE
PH, POC UA: 7
PROTEIN, POC: NORMAL
SPECIFIC GRAVITY, POC UA: NORMAL
UROBILINOGEN, POC UA: NORMAL

## 2019-03-13 PROCEDURE — 0502F SUBSEQUENT PRENATAL CARE: CPT | Mod: CPTII,S$GLB,, | Performed by: OBSTETRICS & GYNECOLOGY

## 2019-03-13 PROCEDURE — 0502F PR SUBSEQUENT PRENATAL CARE: ICD-10-PCS | Mod: CPTII,S$GLB,, | Performed by: OBSTETRICS & GYNECOLOGY

## 2019-03-13 PROCEDURE — 99999 PR PBB SHADOW E&M-EST. PATIENT-LVL II: CPT | Mod: PBBFAC,,, | Performed by: OBSTETRICS & GYNECOLOGY

## 2019-03-13 PROCEDURE — 99999 PR PBB SHADOW E&M-EST. PATIENT-LVL II: ICD-10-PCS | Mod: PBBFAC,,, | Performed by: OBSTETRICS & GYNECOLOGY

## 2019-03-13 NOTE — PROGRESS NOTES
Complaints today: bilateral hand numbness with activity - counseled on splints.   No headaches, on baby ASA    /82   Wt 108.2 kg (238 lb 8.6 oz)   LMP 2018 (Exact Date)   BMI 38.50 kg/m²     28 y.o., at 22w4d by Estimated Date of Delivery: 19  Patient Active Problem List   Diagnosis    ASCUS (atypical squamous cells of undetermined significance) on Pap smear    Encounter for supervision of normal first pregnancy in first trimester    Diet controlled gestational diabetes mellitus in third trimester     OB History    Para Term  AB Living   2 1 1     1   SAB TAB Ectopic Multiple Live Births           1      # Outcome Date GA Lbr Kelby/2nd Weight Sex Delivery Anes PTL Lv   2 Current            1 Term 16 40w0d  3.912 kg (8 lb 10 oz) F CS-LTranv EPI N JEFFREY      Complications: Failure to Progress in First Stage          Dating reviewed    Allergies and problem list reviewed and updated    Medical and surgical history reviewed    Prenatal labs reviewed and updated    Physical Exam:  ABD: soft, gravid, nontender,    Assessment:  22 weeks, doing well    Plan:   Hand splints  glucola at follow up   follow up 3 Weeks, kick counts, labor precautions

## 2019-03-14 ENCOUNTER — PATIENT MESSAGE (OUTPATIENT)
Dept: OBSTETRICS AND GYNECOLOGY | Facility: CLINIC | Age: 29
End: 2019-03-14

## 2019-04-02 ENCOUNTER — PATIENT MESSAGE (OUTPATIENT)
Dept: OBSTETRICS AND GYNECOLOGY | Facility: CLINIC | Age: 29
End: 2019-04-02

## 2019-04-02 ENCOUNTER — ROUTINE PRENATAL (OUTPATIENT)
Dept: OBSTETRICS AND GYNECOLOGY | Facility: CLINIC | Age: 29
End: 2019-04-02
Payer: COMMERCIAL

## 2019-04-02 ENCOUNTER — LAB VISIT (OUTPATIENT)
Dept: LAB | Facility: HOSPITAL | Age: 29
End: 2019-04-02
Attending: OBSTETRICS & GYNECOLOGY
Payer: COMMERCIAL

## 2019-04-02 VITALS
BODY MASS INDEX: 38.71 KG/M2 | WEIGHT: 239.88 LBS | SYSTOLIC BLOOD PRESSURE: 138 MMHG | DIASTOLIC BLOOD PRESSURE: 64 MMHG

## 2019-04-02 DIAGNOSIS — Z3A.25 25 WEEKS GESTATION OF PREGNANCY: Primary | ICD-10-CM

## 2019-04-02 DIAGNOSIS — Z3A.22 22 WEEKS GESTATION OF PREGNANCY: ICD-10-CM

## 2019-04-02 LAB — GLUCOSE SERPL-MCNC: 280 MG/DL (ref 70–140)

## 2019-04-02 PROCEDURE — 82950 GLUCOSE TEST: CPT

## 2019-04-02 PROCEDURE — 99999 PR PBB SHADOW E&M-EST. PATIENT-LVL II: CPT | Mod: PBBFAC,,, | Performed by: OBSTETRICS & GYNECOLOGY

## 2019-04-02 PROCEDURE — 99999 PR PBB SHADOW E&M-EST. PATIENT-LVL II: ICD-10-PCS | Mod: PBBFAC,,, | Performed by: OBSTETRICS & GYNECOLOGY

## 2019-04-02 PROCEDURE — 36415 COLL VENOUS BLD VENIPUNCTURE: CPT | Mod: PO

## 2019-04-02 PROCEDURE — 0502F SUBSEQUENT PRENATAL CARE: CPT | Mod: CPTII,S$GLB,, | Performed by: OBSTETRICS & GYNECOLOGY

## 2019-04-02 PROCEDURE — 0502F PR SUBSEQUENT PRENATAL CARE: ICD-10-PCS | Mod: CPTII,S$GLB,, | Performed by: OBSTETRICS & GYNECOLOGY

## 2019-04-02 RX ORDER — DEXTROSE 4 G
1 TABLET,CHEWABLE ORAL 4 TIMES DAILY
Qty: 1 EACH | Refills: 0 | Status: SHIPPED | OUTPATIENT
Start: 2019-04-02 | End: 2019-08-16

## 2019-04-02 RX ORDER — GLYBURIDE 2.5 MG/1
2.5 TABLET ORAL
Qty: 90 TABLET | Refills: 3 | Status: SHIPPED | OUTPATIENT
Start: 2019-04-02 | End: 2019-04-16 | Stop reason: SDUPTHER

## 2019-04-02 NOTE — PROGRESS NOTES
Complaints today: none, glucola done today    /64   Wt 108.8 kg (239 lb 13.8 oz)   LMP 2018 (Exact Date)   BMI 38.71 kg/m²     28 y.o., at 25w3d by Estimated Date of Delivery: 19  Patient Active Problem List   Diagnosis    ASCUS (atypical squamous cells of undetermined significance) on Pap smear    Encounter for supervision of normal first pregnancy in first trimester    Diet controlled gestational diabetes mellitus in third trimester     OB History    Para Term  AB Living   2 1 1     1   SAB TAB Ectopic Multiple Live Births           1      # Outcome Date GA Lbr Kelby/2nd Weight Sex Delivery Anes PTL Lv   2 Current            1 Term 16 40w0d  3.912 kg (8 lb 10 oz) F CS-LTranv EPI N JEFFREY      Complications: Failure to Progress in First Stage       Dating reviewed    Allergies and problem list reviewed and updated    Medical and surgical history reviewed    Prenatal labs reviewed and updated    Physical Exam:  ABD: soft, gravid, nontender    Assessment:  25 weeks,doing well    Plan:    follow up 2 Weeks, glucola today kick counts, labor precautions

## 2019-04-03 ENCOUNTER — PATIENT MESSAGE (OUTPATIENT)
Dept: OBSTETRICS AND GYNECOLOGY | Facility: CLINIC | Age: 29
End: 2019-04-03

## 2019-04-03 RX ORDER — LANCETS 33 GAUGE
1 EACH MISCELLANEOUS 4 TIMES DAILY
Qty: 100 EACH | Refills: 6 | Status: SHIPPED | OUTPATIENT
Start: 2019-04-03 | End: 2019-08-16

## 2019-04-05 ENCOUNTER — PATIENT MESSAGE (OUTPATIENT)
Dept: OBSTETRICS AND GYNECOLOGY | Facility: CLINIC | Age: 29
End: 2019-04-05

## 2019-04-10 ENCOUNTER — TELEPHONE (OUTPATIENT)
Dept: OBSTETRICS AND GYNECOLOGY | Facility: CLINIC | Age: 29
End: 2019-04-10

## 2019-04-16 ENCOUNTER — HOSPITAL ENCOUNTER (OUTPATIENT)
Dept: RADIOLOGY | Facility: HOSPITAL | Age: 29
Discharge: HOME | End: 2019-04-16
Attending: OBSTETRICS & GYNECOLOGY
Payer: COMMERCIAL

## 2019-04-16 ENCOUNTER — PATIENT MESSAGE (OUTPATIENT)
Dept: OBSTETRICS AND GYNECOLOGY | Facility: CLINIC | Age: 29
End: 2019-04-16

## 2019-04-16 ENCOUNTER — ROUTINE PRENATAL (OUTPATIENT)
Dept: OBSTETRICS AND GYNECOLOGY | Facility: CLINIC | Age: 29
End: 2019-04-16
Payer: COMMERCIAL

## 2019-04-16 VITALS
DIASTOLIC BLOOD PRESSURE: 68 MMHG | BODY MASS INDEX: 38.54 KG/M2 | WEIGHT: 238.75 LBS | SYSTOLIC BLOOD PRESSURE: 142 MMHG

## 2019-04-16 DIAGNOSIS — Z3A.25 25 WEEKS GESTATION OF PREGNANCY: ICD-10-CM

## 2019-04-16 DIAGNOSIS — Z3A.27 27 WEEKS GESTATION OF PREGNANCY: Primary | ICD-10-CM

## 2019-04-16 LAB
BILIRUB SERPL-MCNC: NORMAL MG/DL
BLOOD URINE, POC: NORMAL
COLOR, POC UA: NORMAL
GLUCOSE UR QL STRIP: NORMAL
KETONES UR QL STRIP: NORMAL
LEUKOCYTE ESTERASE URINE, POC: NORMAL
NITRITE, POC UA: NORMAL
PH, POC UA: 6
PROTEIN, POC: NORMAL
SPECIFIC GRAVITY, POC UA: NORMAL
UROBILINOGEN, POC UA: NORMAL

## 2019-04-16 PROCEDURE — 0502F SUBSEQUENT PRENATAL CARE: CPT | Mod: CPTII,S$GLB,, | Performed by: OBSTETRICS & GYNECOLOGY

## 2019-04-16 PROCEDURE — 76816 OB US FOLLOW-UP PER FETUS: CPT | Mod: 26,,, | Performed by: RADIOLOGY

## 2019-04-16 PROCEDURE — 99999 PR PBB SHADOW E&M-EST. PATIENT-LVL III: ICD-10-PCS | Mod: PBBFAC,,, | Performed by: OBSTETRICS & GYNECOLOGY

## 2019-04-16 PROCEDURE — 99999 PR PBB SHADOW E&M-EST. PATIENT-LVL III: CPT | Mod: PBBFAC,,, | Performed by: OBSTETRICS & GYNECOLOGY

## 2019-04-16 PROCEDURE — 76816 US OB FOLLOW UP EA GESTATION TRANSABDOMINAL: ICD-10-PCS | Mod: 26,,, | Performed by: RADIOLOGY

## 2019-04-16 PROCEDURE — 0502F PR SUBSEQUENT PRENATAL CARE: ICD-10-PCS | Mod: CPTII,S$GLB,, | Performed by: OBSTETRICS & GYNECOLOGY

## 2019-04-16 PROCEDURE — 76816 OB US FOLLOW-UP PER FETUS: CPT | Mod: TC,PN

## 2019-04-16 RX ORDER — GLYBURIDE 2.5 MG/1
TABLET ORAL
Qty: 90 TABLET | Refills: 3 | Status: SHIPPED | OUTPATIENT
Start: 2019-04-16 | End: 2019-05-29

## 2019-04-16 NOTE — PROGRESS NOTES
Complaints today:   BS:   Fasting 100s-110s  postpransial 120s-130s      BP (!) 142/68   Wt 108.3 kg (238 lb 12.1 oz)   LMP 2018 (Exact Date)   BMI 38.54 kg/m²     28 y.o., at 27w3d by Estimated Date of Delivery: 19  Patient Active Problem List   Diagnosis    ASCUS (atypical squamous cells of undetermined significance) on Pap smear    Encounter for supervision of normal first pregnancy in first trimester    Diet controlled gestational diabetes mellitus in third trimester     OB History    Para Term  AB Living   2 1 1     1   SAB TAB Ectopic Multiple Live Births           1      # Outcome Date GA Lbr Kelby/2nd Weight Sex Delivery Anes PTL Lv   2 Current            1 Term 16 40w0d  3.912 kg (8 lb 10 oz) F CS-LTranv EPI N JEFFREY      Complications: Failure to Progress in First Stage       Dating reviewed    Allergies and problem list reviewed and updated    Medical and surgical history reviewed    Prenatal labs reviewed and updated    Physical Exam:  ABD: soft, gravid, nontender    Assessment:  27 weeks, poor glucose control    Plan:   Increase glyburide to 5mg qhs, 2.5mg qam   follow up 1 Weeks, kick counts, labor precautions   U/s today for growth

## 2019-04-24 ENCOUNTER — PATIENT MESSAGE (OUTPATIENT)
Dept: OBSTETRICS AND GYNECOLOGY | Facility: CLINIC | Age: 29
End: 2019-04-24

## 2019-04-25 ENCOUNTER — ROUTINE PRENATAL (OUTPATIENT)
Dept: OBSTETRICS AND GYNECOLOGY | Facility: CLINIC | Age: 29
End: 2019-04-25
Payer: COMMERCIAL

## 2019-04-25 ENCOUNTER — PATIENT MESSAGE (OUTPATIENT)
Dept: OBSTETRICS AND GYNECOLOGY | Facility: CLINIC | Age: 29
End: 2019-04-25

## 2019-04-25 VITALS
DIASTOLIC BLOOD PRESSURE: 62 MMHG | BODY MASS INDEX: 38.54 KG/M2 | WEIGHT: 238.75 LBS | SYSTOLIC BLOOD PRESSURE: 140 MMHG

## 2019-04-25 DIAGNOSIS — Z3A.28 28 WEEKS GESTATION OF PREGNANCY: Primary | ICD-10-CM

## 2019-04-25 LAB
BILIRUB SERPL-MCNC: NEGATIVE MG/DL
BLOOD URINE, POC: NEGATIVE
COLOR, POC UA: NORMAL
GLUCOSE UR QL STRIP: NORMAL
KETONES UR QL STRIP: NEGATIVE
LEUKOCYTE ESTERASE URINE, POC: NEGATIVE
NITRITE, POC UA: NEGATIVE
PH, POC UA: 7
PROTEIN, POC: NEGATIVE
SPECIFIC GRAVITY, POC UA: NORMAL
UROBILINOGEN, POC UA: NORMAL

## 2019-04-25 PROCEDURE — 99999 PR PBB SHADOW E&M-EST. PATIENT-LVL II: CPT | Mod: PBBFAC,,, | Performed by: OBSTETRICS & GYNECOLOGY

## 2019-04-25 PROCEDURE — 0502F SUBSEQUENT PRENATAL CARE: CPT | Mod: CPTII,S$GLB,, | Performed by: OBSTETRICS & GYNECOLOGY

## 2019-04-25 PROCEDURE — 99999 PR PBB SHADOW E&M-EST. PATIENT-LVL II: ICD-10-PCS | Mod: PBBFAC,,, | Performed by: OBSTETRICS & GYNECOLOGY

## 2019-04-25 PROCEDURE — 0502F PR SUBSEQUENT PRENATAL CARE: ICD-10-PCS | Mod: CPTII,S$GLB,, | Performed by: OBSTETRICS & GYNECOLOGY

## 2019-04-25 RX ORDER — SYRINGE-NEEDLE,INSULIN,0.5 ML 27GX1/2"
1 SYRINGE, EMPTY DISPOSABLE MISCELLANEOUS 2 TIMES DAILY
Qty: 100 EACH | Refills: 2 | COMMUNITY
Start: 2019-04-25 | End: 2019-05-29

## 2019-04-25 NOTE — PROGRESS NOTES
Complaints today:  Discussed poor glucose control  Counseled on starting SQ insulin    BP (!) 140/62   Wt 108.3 kg (238 lb 12.1 oz)   LMP 2018 (Exact Date)   BMI 38.54 kg/m²     28 y.o., at 28w5d by Estimated Date of Delivery: 19  Patient Active Problem List   Diagnosis    ASCUS (atypical squamous cells of undetermined significance) on Pap smear    Encounter for supervision of normal first pregnancy in first trimester    Diet controlled gestational diabetes mellitus in third trimester     OB History    Para Term  AB Living   2 1 1     1   SAB TAB Ectopic Multiple Live Births           1      # Outcome Date GA Lbr Kelby/2nd Weight Sex Delivery Anes PTL Lv   2 Current            1 Term 16 40w0d  3.912 kg (8 lb 10 oz) F CS-LTranv EPI N JEFFREY      Complications: Failure to Progress in First Stage       Dating reviewed    Allergies and problem list reviewed and updated    Medical and surgical history reviewed    Prenatal labs reviewed and updated    Physical Exam:  ABD: soft, gravid, nontender    Assessment:  28 weeks, gestational diabetes, poor control on oral meds    Plan:   SQ insulin 70/30  8uqam / 4uqpm   follow up 1 Weeks,  kick counts, labor precautions   Follow up for diabetic teaching with nurse when filled insulin prescription

## 2019-04-26 ENCOUNTER — PATIENT MESSAGE (OUTPATIENT)
Dept: OBSTETRICS AND GYNECOLOGY | Facility: CLINIC | Age: 29
End: 2019-04-26

## 2019-04-26 RX ORDER — PEN NEEDLE, DIABETIC 30 GX3/16"
1 NEEDLE, DISPOSABLE MISCELLANEOUS 4 TIMES DAILY
Qty: 100 EACH | Refills: 3 | Status: SHIPPED | OUTPATIENT
Start: 2019-04-26 | End: 2019-08-16

## 2019-04-29 ENCOUNTER — PATIENT MESSAGE (OUTPATIENT)
Dept: OBSTETRICS AND GYNECOLOGY | Facility: CLINIC | Age: 29
End: 2019-04-29

## 2019-04-29 ENCOUNTER — DOCUMENTATION ONLY (OUTPATIENT)
Dept: OBSTETRICS AND GYNECOLOGY | Facility: CLINIC | Age: 29
End: 2019-04-29

## 2019-05-08 ENCOUNTER — ROUTINE PRENATAL (OUTPATIENT)
Dept: OBSTETRICS AND GYNECOLOGY | Facility: CLINIC | Age: 29
End: 2019-05-08
Payer: COMMERCIAL

## 2019-05-08 VITALS
DIASTOLIC BLOOD PRESSURE: 62 MMHG | BODY MASS INDEX: 39.35 KG/M2 | WEIGHT: 243.81 LBS | SYSTOLIC BLOOD PRESSURE: 136 MMHG

## 2019-05-08 DIAGNOSIS — Z3A.30 30 WEEKS GESTATION OF PREGNANCY: Primary | ICD-10-CM

## 2019-05-08 PROCEDURE — 0502F PR SUBSEQUENT PRENATAL CARE: ICD-10-PCS | Mod: CPTII,S$GLB,, | Performed by: OBSTETRICS & GYNECOLOGY

## 2019-05-08 PROCEDURE — 99999 PR PBB SHADOW E&M-EST. PATIENT-LVL III: CPT | Mod: PBBFAC,,, | Performed by: OBSTETRICS & GYNECOLOGY

## 2019-05-08 PROCEDURE — 99999 PR PBB SHADOW E&M-EST. PATIENT-LVL III: ICD-10-PCS | Mod: PBBFAC,,, | Performed by: OBSTETRICS & GYNECOLOGY

## 2019-05-08 PROCEDURE — 0502F SUBSEQUENT PRENATAL CARE: CPT | Mod: CPTII,S$GLB,, | Performed by: OBSTETRICS & GYNECOLOGY

## 2019-05-08 NOTE — PROGRESS NOTES
Complaints today: none  BSL fasting 100s, postpransials 90s-100s      /62   Wt 110.6 kg (243 lb 13.3 oz)   LMP 2018 (Exact Date)   BMI 39.35 kg/m²     28 y.o., at 30w4d by Estimated Date of Delivery: 19  Patient Active Problem List   Diagnosis    ASCUS (atypical squamous cells of undetermined significance) on Pap smear    Encounter for supervision of normal first pregnancy in first trimester    Diet controlled gestational diabetes mellitus in third trimester     OB History    Para Term  AB Living   2 1 1     1   SAB TAB Ectopic Multiple Live Births           1      # Outcome Date GA Lbr Kelby/2nd Weight Sex Delivery Anes PTL Lv   2 Current            1 Term 16 40w0d  3.912 kg (8 lb 10 oz) F CS-LTranv EPI N JEFFREY      Complications: Failure to Progress in First Stage       Dating reviewed    Allergies and problem list reviewed and updated    Medical and surgical history reviewed    Prenatal labs reviewed and updated    Physical Exam:  ABD: soft, gravid, nontender    Assessment:  30 weeks, improving glucose control  chtn -well controlled  On insulin 70/30, 10 u am/6u pm      Plan:   Increase insulin to (70/30), 10am/10pm  Send BS log next week  Start weekly NST at 32 weeks, growth ultrasound   follow up 2 Weeks,  kick counts, labor precautions

## 2019-05-15 ENCOUNTER — PATIENT MESSAGE (OUTPATIENT)
Dept: OBSTETRICS AND GYNECOLOGY | Facility: CLINIC | Age: 29
End: 2019-05-15

## 2019-05-17 RX ORDER — METHYLDOPA 250 MG/1
250 TABLET, FILM COATED ORAL EVERY 12 HOURS
Qty: 60 TABLET | Refills: 1 | Status: SHIPPED | OUTPATIENT
Start: 2019-05-17 | End: 2019-06-25 | Stop reason: SDUPTHER

## 2019-05-22 ENCOUNTER — ROUTINE PRENATAL (OUTPATIENT)
Dept: OBSTETRICS AND GYNECOLOGY | Facility: CLINIC | Age: 29
End: 2019-05-22
Payer: COMMERCIAL

## 2019-05-22 VITALS
WEIGHT: 243.38 LBS | BODY MASS INDEX: 39.28 KG/M2 | SYSTOLIC BLOOD PRESSURE: 128 MMHG | DIASTOLIC BLOOD PRESSURE: 74 MMHG

## 2019-05-22 DIAGNOSIS — O16.3 HYPERTENSION AFFECTING PREGNANCY IN THIRD TRIMESTER: ICD-10-CM

## 2019-05-22 DIAGNOSIS — Z3A.32 32 WEEKS GESTATION OF PREGNANCY: Primary | ICD-10-CM

## 2019-05-22 DIAGNOSIS — O36.63X0 EXCESSIVE FETAL GROWTH AFFECTING MANAGEMENT OF PREGNANCY IN THIRD TRIMESTER, SINGLE OR UNSPECIFIED FETUS: ICD-10-CM

## 2019-05-22 DIAGNOSIS — O24.414 INSULIN CONTROLLED GESTATIONAL DIABETES MELLITUS (GDM) IN THIRD TRIMESTER: ICD-10-CM

## 2019-05-22 LAB
BILIRUB SERPL-MCNC: NEGATIVE MG/DL
BLOOD URINE, POC: NEGATIVE
COLOR, POC UA: NORMAL
GLUCOSE UR QL STRIP: NEGATIVE
KETONES UR QL STRIP: NEGATIVE
LEUKOCYTE ESTERASE URINE, POC: NEGATIVE
NITRITE, POC UA: NEGATIVE
PH, POC UA: 7
PROTEIN, POC: NEGATIVE
SPECIFIC GRAVITY, POC UA: NORMAL
UROBILINOGEN, POC UA: NEGATIVE

## 2019-05-22 PROCEDURE — 3008F BODY MASS INDEX DOCD: CPT | Mod: CPTII,S$GLB,, | Performed by: OBSTETRICS & GYNECOLOGY

## 2019-05-22 PROCEDURE — 99999 PR PBB SHADOW E&M-EST. PATIENT-LVL III: CPT | Mod: PBBFAC,,, | Performed by: OBSTETRICS & GYNECOLOGY

## 2019-05-22 PROCEDURE — 99999 PR PBB SHADOW E&M-EST. PATIENT-LVL III: ICD-10-PCS | Mod: PBBFAC,,, | Performed by: OBSTETRICS & GYNECOLOGY

## 2019-05-22 PROCEDURE — 59025 PR FETAL 2N-STRESS TEST: ICD-10-PCS | Mod: S$GLB,,, | Performed by: OBSTETRICS & GYNECOLOGY

## 2019-05-22 PROCEDURE — 99213 OFFICE O/P EST LOW 20 MIN: CPT | Mod: 25,S$GLB,, | Performed by: OBSTETRICS & GYNECOLOGY

## 2019-05-22 PROCEDURE — 81002 POCT URINE DIPSTICK WITHOUT MICROSCOPE: ICD-10-PCS | Mod: S$GLB,,, | Performed by: OBSTETRICS & GYNECOLOGY

## 2019-05-22 PROCEDURE — 81002 URINALYSIS NONAUTO W/O SCOPE: CPT | Mod: S$GLB,,, | Performed by: OBSTETRICS & GYNECOLOGY

## 2019-05-22 PROCEDURE — 99213 PR OFFICE/OUTPT VISIT, EST, LEVL III, 20-29 MIN: ICD-10-PCS | Mod: 25,S$GLB,, | Performed by: OBSTETRICS & GYNECOLOGY

## 2019-05-22 PROCEDURE — 59025 FETAL NON-STRESS TEST: CPT | Mod: S$GLB,,, | Performed by: OBSTETRICS & GYNECOLOGY

## 2019-05-22 PROCEDURE — 3008F PR BODY MASS INDEX (BMI) DOCUMENTED: ICD-10-PCS | Mod: CPTII,S$GLB,, | Performed by: OBSTETRICS & GYNECOLOGY

## 2019-05-22 NOTE — PROGRESS NOTES
Complaints today: none, good fetal movements  BS:   fasting  100-120 postprandials    Increase to 18uam / 14u pm of 70/30    /74   Wt 110.4 kg (243 lb 6.2 oz)   LMP 2018 (Exact Date)   BMI 39.28 kg/m²     28 y.o., at 32w4d by Estimated Date of Delivery: 19  Patient Active Problem List   Diagnosis    ASCUS (atypical squamous cells of undetermined significance) on Pap smear    Encounter for supervision of normal first pregnancy in first trimester    Diet controlled gestational diabetes mellitus in third trimester     OB History    Para Term  AB Living   2 1 1     1   SAB TAB Ectopic Multiple Live Births           1      # Outcome Date GA Lbr Kelby/2nd Weight Sex Delivery Anes PTL Lv   2 Current            1 Term 16 40w0d  3.912 kg (8 lb 10 oz) F CS-LTranv EPI N JEFFREY      Complications: Failure to Progress in First Stage       Dating reviewed    Allergies and problem list reviewed and updated    Medical and surgical history reviewed    Prenatal labs reviewed and updated    Physical Exam:  ABD: soft, gravid, nontender    Assessment:  32 weeks, S>D  Htn, gest diabetes    Plan:   U/s with BPP at follow up   follow up 1 Weeks, kick counts, labor precautions   U/s with BPP at follow up

## 2019-05-22 NOTE — PROCEDURES
Procedures     FETAL SURVEILLANCE TESTING SUMMARY    INDICATIONS:  Gestational diabetes, chronic hypertension    Fetal doppler heart rate tracing obtained in the usual fashion with the patient in the left supine position.    Duration of monitorinmin    OBJECTIVE RESULTS:  Baseline fetal heart rate: 150bpm    Fetal heart variability: moderate  Fetal Heart Rate decelerations: none  Fetal acoustic stimulator: yes  Fetal Heart Rate accelerations: yes  Fetal Non-stress Test: reactive    Fetal surveillance: reassuring        Jose Hood M.D., FACOG

## 2019-05-29 ENCOUNTER — HOSPITAL ENCOUNTER (OUTPATIENT)
Dept: RADIOLOGY | Facility: HOSPITAL | Age: 29
Discharge: HOME | End: 2019-05-29
Attending: OBSTETRICS & GYNECOLOGY
Payer: COMMERCIAL

## 2019-05-29 ENCOUNTER — ROUTINE PRENATAL (OUTPATIENT)
Dept: OBSTETRICS AND GYNECOLOGY | Facility: CLINIC | Age: 29
End: 2019-05-29
Payer: COMMERCIAL

## 2019-05-29 VITALS — WEIGHT: 245.38 LBS | BODY MASS INDEX: 39.6 KG/M2 | SYSTOLIC BLOOD PRESSURE: 120 MMHG | DIASTOLIC BLOOD PRESSURE: 72 MMHG

## 2019-05-29 DIAGNOSIS — O24.414 INSULIN CONTROLLED GESTATIONAL DIABETES MELLITUS (GDM) IN THIRD TRIMESTER: ICD-10-CM

## 2019-05-29 DIAGNOSIS — Z3A.33 33 WEEKS GESTATION OF PREGNANCY: Primary | ICD-10-CM

## 2019-05-29 DIAGNOSIS — O16.3 HYPERTENSION AFFECTING PREGNANCY IN THIRD TRIMESTER: ICD-10-CM

## 2019-05-29 DIAGNOSIS — O36.63X0 EXCESSIVE FETAL GROWTH AFFECTING MANAGEMENT OF PREGNANCY IN THIRD TRIMESTER, SINGLE OR UNSPECIFIED FETUS: ICD-10-CM

## 2019-05-29 LAB
BILIRUB SERPL-MCNC: NEGATIVE MG/DL
BLOOD URINE, POC: NEGATIVE
COLOR, POC UA: NORMAL
GLUCOSE UR QL STRIP: NEGATIVE
KETONES UR QL STRIP: NEGATIVE
LEUKOCYTE ESTERASE URINE, POC: NEGATIVE
NITRITE, POC UA: NEGATIVE
PH, POC UA: 8
PROTEIN, POC: NORMAL
SPECIFIC GRAVITY, POC UA: NORMAL
UROBILINOGEN, POC UA: NEGATIVE

## 2019-05-29 PROCEDURE — 99999 PR PBB SHADOW E&M-EST. PATIENT-LVL II: ICD-10-PCS | Mod: PBBFAC,,, | Performed by: OBSTETRICS & GYNECOLOGY

## 2019-05-29 PROCEDURE — 0502F PR SUBSEQUENT PRENATAL CARE: ICD-10-PCS | Mod: CPTII,S$GLB,, | Performed by: OBSTETRICS & GYNECOLOGY

## 2019-05-29 PROCEDURE — 76819 US FETAL BIOPHYSICAL PROFILE WO NON STRESS TESTING: ICD-10-PCS | Mod: 26,,, | Performed by: RADIOLOGY

## 2019-05-29 PROCEDURE — 0502F SUBSEQUENT PRENATAL CARE: CPT | Mod: CPTII,S$GLB,, | Performed by: OBSTETRICS & GYNECOLOGY

## 2019-05-29 PROCEDURE — 76819 FETAL BIOPHYS PROFIL W/O NST: CPT | Mod: TC,PN

## 2019-05-29 PROCEDURE — 99999 PR PBB SHADOW E&M-EST. PATIENT-LVL II: CPT | Mod: PBBFAC,,, | Performed by: OBSTETRICS & GYNECOLOGY

## 2019-05-29 PROCEDURE — 76819 FETAL BIOPHYS PROFIL W/O NST: CPT | Mod: 26,,, | Performed by: RADIOLOGY

## 2019-05-29 RX ORDER — INSULIN GLARGINE 100 [IU]/ML
14 INJECTION, SOLUTION SUBCUTANEOUS NIGHTLY
Qty: 3 ML | Refills: 3 | Status: SHIPPED | OUTPATIENT
Start: 2019-05-29 | End: 2019-08-16

## 2019-05-29 NOTE — PROGRESS NOTES
Complaints today: poor gluose control, log not with her today  BS reported same on Increase to 18uam / 14u pm of   Plan change PM insulin to lente 14u , keep AM  18u    /72   Wt 111.3 kg (245 lb 6 oz)   LMP 2018 (Exact Date)   BMI 39.60 kg/m²     28 y.o., at 33w4d by Estimated Date of Delivery: 19  Patient Active Problem List   Diagnosis    ASCUS (atypical squamous cells of undetermined significance) on Pap smear    Encounter for supervision of normal first pregnancy in first trimester    Diet controlled gestational diabetes mellitus in third trimester     OB History    Para Term  AB Living   2 1 1     1   SAB TAB Ectopic Multiple Live Births           1      # Outcome Date GA Lbr Kelby/2nd Weight Sex Delivery Anes PTL Lv   2 Current            1 Term 16 40w0d  3.912 kg (8 lb 10 oz) F CS-LTranv EPI N JEFFREY      Complications: Failure to Progress in First Stage       Dating reviewed    Allergies and problem list reviewed and updated    Medical and surgical history reviewed    Prenatal labs reviewed and updated    Physical Exam:  ABD: soft, gravid, nontender    Assessment:  33 weeks, BPP today    Plan:   Change pm dose to Lantus   follow up 1 Weeks, kick counts, labor precautions    U/s for growth at follow up

## 2019-05-31 ENCOUNTER — PATIENT MESSAGE (OUTPATIENT)
Dept: OBSTETRICS AND GYNECOLOGY | Facility: CLINIC | Age: 29
End: 2019-05-31

## 2019-06-03 ENCOUNTER — PATIENT MESSAGE (OUTPATIENT)
Dept: OBSTETRICS AND GYNECOLOGY | Facility: CLINIC | Age: 29
End: 2019-06-03

## 2019-06-05 ENCOUNTER — HOSPITAL ENCOUNTER (OUTPATIENT)
Dept: RADIOLOGY | Facility: HOSPITAL | Age: 29
Discharge: HOME | End: 2019-06-05
Attending: OBSTETRICS & GYNECOLOGY
Payer: COMMERCIAL

## 2019-06-05 ENCOUNTER — ROUTINE PRENATAL (OUTPATIENT)
Dept: OBSTETRICS AND GYNECOLOGY | Facility: CLINIC | Age: 29
End: 2019-06-05
Payer: COMMERCIAL

## 2019-06-05 VITALS
SYSTOLIC BLOOD PRESSURE: 130 MMHG | BODY MASS INDEX: 40.42 KG/M2 | WEIGHT: 250.44 LBS | DIASTOLIC BLOOD PRESSURE: 70 MMHG

## 2019-06-05 DIAGNOSIS — Z3A.34 34 WEEKS GESTATION OF PREGNANCY: Primary | ICD-10-CM

## 2019-06-05 DIAGNOSIS — O16.3 HYPERTENSION AFFECTING PREGNANCY IN THIRD TRIMESTER: ICD-10-CM

## 2019-06-05 DIAGNOSIS — O24.410 DIET CONTROLLED GESTATIONAL DIABETES MELLITUS IN THIRD TRIMESTER: ICD-10-CM

## 2019-06-05 DIAGNOSIS — O24.414 INSULIN CONTROLLED GESTATIONAL DIABETES MELLITUS (GDM) IN THIRD TRIMESTER: ICD-10-CM

## 2019-06-05 DIAGNOSIS — O36.63X0 EXCESSIVE FETAL GROWTH AFFECTING MANAGEMENT OF PREGNANCY IN THIRD TRIMESTER, SINGLE OR UNSPECIFIED FETUS: ICD-10-CM

## 2019-06-05 LAB
BILIRUB SERPL-MCNC: NORMAL MG/DL
BLOOD URINE, POC: NORMAL
COLOR, POC UA: NORMAL
GLUCOSE UR QL STRIP: 1
KETONES UR QL STRIP: NORMAL
LEUKOCYTE ESTERASE URINE, POC: NORMAL
NITRITE, POC UA: NORMAL
PH, POC UA: 5
PROTEIN, POC: NORMAL
SPECIFIC GRAVITY, POC UA: NORMAL
UROBILINOGEN, POC UA: NORMAL

## 2019-06-05 PROCEDURE — 0502F PR SUBSEQUENT PRENATAL CARE: ICD-10-PCS | Mod: CPTII,S$GLB,, | Performed by: OBSTETRICS & GYNECOLOGY

## 2019-06-05 PROCEDURE — 0502F SUBSEQUENT PRENATAL CARE: CPT | Mod: CPTII,S$GLB,, | Performed by: OBSTETRICS & GYNECOLOGY

## 2019-06-05 PROCEDURE — 76816 US OB FOLLOW UP EA GESTATION TRANSABDOMINAL: ICD-10-PCS | Mod: 26,,, | Performed by: RADIOLOGY

## 2019-06-05 PROCEDURE — 59025 FETAL NON-STRESS TEST: CPT | Mod: S$GLB,,, | Performed by: OBSTETRICS & GYNECOLOGY

## 2019-06-05 PROCEDURE — 99999 PR PBB SHADOW E&M-EST. PATIENT-LVL III: ICD-10-PCS | Mod: PBBFAC,,, | Performed by: OBSTETRICS & GYNECOLOGY

## 2019-06-05 PROCEDURE — 76816 OB US FOLLOW-UP PER FETUS: CPT | Mod: TC,PN

## 2019-06-05 PROCEDURE — 99999 PR PBB SHADOW E&M-EST. PATIENT-LVL III: CPT | Mod: PBBFAC,,, | Performed by: OBSTETRICS & GYNECOLOGY

## 2019-06-05 PROCEDURE — 76816 OB US FOLLOW-UP PER FETUS: CPT | Mod: 26,,, | Performed by: RADIOLOGY

## 2019-06-05 PROCEDURE — 59025 PR FETAL 2N-STRESS TEST: ICD-10-PCS | Mod: S$GLB,,, | Performed by: OBSTETRICS & GYNECOLOGY

## 2019-06-05 NOTE — PROGRESS NOTES
Complaints today: good movements  BS:   fasting= 100s   postprandials= 110s      /70   Wt 113.6 kg (250 lb 7.1 oz)   LMP 2018 (Exact Date)   BMI 40.42 kg/m²     28 y.o., at 34w4d by Estimated Date of Delivery: 19  Patient Active Problem List   Diagnosis    ASCUS (atypical squamous cells of undetermined significance) on Pap smear    Encounter for supervision of normal first pregnancy in first trimester    Diet controlled gestational diabetes mellitus in third trimester     OB History    Para Term  AB Living   2 1 1     1   SAB TAB Ectopic Multiple Live Births           1      # Outcome Date GA Lbr Kelby/2nd Weight Sex Delivery Anes PTL Lv   2 Current            1 Term 16 40w0d  3.912 kg (8 lb 10 oz) F CS-LTranv EPI N JEFFREY      Complications: Failure to Progress in First Stage       Dating reviewed    Allergies and problem list reviewed and updated    Medical and surgical history reviewed    Prenatal labs reviewed and updated    Physical Exam:  ABD: soft, gravid, nontender    Assessment:  Improving glucose control    Plan:   Increase insulin to 18u AM 18u PM   follow up 1 Weeks,  kick counts, labor precautions   GBS at follow up, u/s today

## 2019-06-05 NOTE — PROCEDURES
Procedures     FETAL SURVEILLANCE TESTING SUMMARY    INDICATIONS:  gestational diabetes mellitus    Fetal doppler heart rate tracing obtained in the usual fashion with the patient in the left supine position.    Duration of monitorinmin    OBJECTIVE RESULTS:  Baseline fetal heart rate: 140bpm    Fetal heart variability: moderate  Fetal Heart Rate decelerations: none  Fetal acoustic stimulator: no  Fetal Heart Rate accelerations: yes  Fetal Non-stress Test: reactive  Uterine irritability: no    Fetal surveillance: reassuring        Jose Hood M.D., FACOG

## 2019-06-12 ENCOUNTER — ROUTINE PRENATAL (OUTPATIENT)
Dept: OBSTETRICS AND GYNECOLOGY | Facility: CLINIC | Age: 29
End: 2019-06-12
Payer: COMMERCIAL

## 2019-06-12 VITALS — SYSTOLIC BLOOD PRESSURE: 128 MMHG | DIASTOLIC BLOOD PRESSURE: 70 MMHG | WEIGHT: 251.56 LBS | BODY MASS INDEX: 40.6 KG/M2

## 2019-06-12 DIAGNOSIS — O16.3 HYPERTENSION AFFECTING PREGNANCY IN THIRD TRIMESTER: ICD-10-CM

## 2019-06-12 DIAGNOSIS — Z3A.35 35 WEEKS GESTATION OF PREGNANCY: Primary | ICD-10-CM

## 2019-06-12 DIAGNOSIS — O24.414 INSULIN CONTROLLED GESTATIONAL DIABETES MELLITUS (GDM) IN THIRD TRIMESTER: ICD-10-CM

## 2019-06-12 PROCEDURE — 0502F SUBSEQUENT PRENATAL CARE: CPT | Mod: CPTII,S$GLB,, | Performed by: OBSTETRICS & GYNECOLOGY

## 2019-06-12 PROCEDURE — 59025 PR FETAL 2N-STRESS TEST: ICD-10-PCS | Mod: S$GLB,,, | Performed by: OBSTETRICS & GYNECOLOGY

## 2019-06-12 PROCEDURE — 99999 PR PBB SHADOW E&M-EST. PATIENT-LVL III: CPT | Mod: PBBFAC,,, | Performed by: OBSTETRICS & GYNECOLOGY

## 2019-06-12 PROCEDURE — 59025 FETAL NON-STRESS TEST: CPT | Mod: S$GLB,,, | Performed by: OBSTETRICS & GYNECOLOGY

## 2019-06-12 PROCEDURE — 0502F PR SUBSEQUENT PRENATAL CARE: ICD-10-PCS | Mod: CPTII,S$GLB,, | Performed by: OBSTETRICS & GYNECOLOGY

## 2019-06-12 PROCEDURE — 99999 PR PBB SHADOW E&M-EST. PATIENT-LVL III: ICD-10-PCS | Mod: PBBFAC,,, | Performed by: OBSTETRICS & GYNECOLOGY

## 2019-06-12 PROCEDURE — 87081 CULTURE SCREEN ONLY: CPT

## 2019-06-12 NOTE — PROGRESS NOTES
Complaints today: good fetal movements     /70   Wt 114.1 kg (251 lb 8.7 oz)   LMP 2018 (Exact Date)   BMI 40.60 kg/m²     28 y.o., at 35w4d by Estimated Date of Delivery: 19  Patient Active Problem List   Diagnosis    ASCUS (atypical squamous cells of undetermined significance) on Pap smear    Encounter for supervision of normal first pregnancy in first trimester    Diet controlled gestational diabetes mellitus in third trimester     OB History    Para Term  AB Living   2 1 1     1   SAB TAB Ectopic Multiple Live Births           1      # Outcome Date GA Lbr Kelby/2nd Weight Sex Delivery Anes PTL Lv   2 Current            1 Term 16 40w0d  3.912 kg (8 lb 10 oz) F CS-LTranv EPI N JEFFREY      Complications: Failure to Progress in First Stage       Dating reviewed    Allergies and problem list reviewed and updated    Medical and surgical history reviewed    Prenatal labs reviewed and updated    Physical Exam:  ABD: soft, gravid, nontender    Assessment:  35 weeks, gest diabetes  Reassuring fetal testing    Plan:   Weekly NST  increas PN NPH to 22u at bedtime   follow up 1 Weeks,  kick counts, labor precautions   GBS today

## 2019-06-12 NOTE — PROCEDURES
Procedures     FETAL SURVEILLANCE TESTING SUMMARY    INDICATIONS:  gestational diabetes mellitus    Fetal doppler heart rate tracing obtained in the usual fashion with the patient in the left supine position.    Duration of monitorinmin    OBJECTIVE RESULTS:  Baseline fetal heart rate: 150bpm    Fetal heart variability: moderate  Fetal Heart Rate decelerations: none  Fetal acoustic stimulator: no  Fetal Heart Rate accelerations: yes  Fetal Non-stress Test: reactive    Fetal surveillance: reassuring        Jose Hood M.D., FACOG

## 2019-06-15 LAB — BACTERIA SPEC AEROBE CULT: NORMAL

## 2019-06-18 ENCOUNTER — ROUTINE PRENATAL (OUTPATIENT)
Dept: OBSTETRICS AND GYNECOLOGY | Facility: CLINIC | Age: 29
End: 2019-06-18
Payer: COMMERCIAL

## 2019-06-18 ENCOUNTER — LAB VISIT (OUTPATIENT)
Dept: LAB | Facility: HOSPITAL | Age: 29
End: 2019-06-18
Attending: OBSTETRICS & GYNECOLOGY
Payer: COMMERCIAL

## 2019-06-18 VITALS
DIASTOLIC BLOOD PRESSURE: 66 MMHG | SYSTOLIC BLOOD PRESSURE: 142 MMHG | BODY MASS INDEX: 41.31 KG/M2 | WEIGHT: 255.94 LBS

## 2019-06-18 DIAGNOSIS — Z3A.36 36 WEEKS GESTATION OF PREGNANCY: ICD-10-CM

## 2019-06-18 DIAGNOSIS — O12.03 GESTATIONAL EDEMA IN THIRD TRIMESTER: ICD-10-CM

## 2019-06-18 DIAGNOSIS — Z3A.36 36 WEEKS GESTATION OF PREGNANCY: Primary | ICD-10-CM

## 2019-06-18 LAB
ALBUMIN SERPL BCP-MCNC: 2.5 G/DL (ref 3.5–5.2)
ALP SERPL-CCNC: 104 U/L (ref 55–135)
ALT SERPL W/O P-5'-P-CCNC: 12 U/L (ref 10–44)
ANION GAP SERPL CALC-SCNC: 7 MMOL/L (ref 8–16)
AST SERPL-CCNC: 14 U/L (ref 10–40)
BILIRUB SERPL-MCNC: 0.4 MG/DL (ref 0.1–1)
BUN SERPL-MCNC: 11 MG/DL (ref 6–20)
CALCIUM SERPL-MCNC: 10.4 MG/DL (ref 8.7–10.5)
CHLORIDE SERPL-SCNC: 107 MMOL/L (ref 95–110)
CO2 SERPL-SCNC: 22 MMOL/L (ref 23–29)
CREAT SERPL-MCNC: 0.7 MG/DL (ref 0.5–1.4)
ERYTHROCYTE [DISTWIDTH] IN BLOOD BY AUTOMATED COUNT: 13.9 % (ref 11.5–14.5)
EST. GFR  (AFRICAN AMERICAN): >60 ML/MIN/1.73 M^2
EST. GFR  (NON AFRICAN AMERICAN): >60 ML/MIN/1.73 M^2
GLUCOSE SERPL-MCNC: 99 MG/DL (ref 70–110)
HCT VFR BLD AUTO: 36.7 % (ref 37–48.5)
HGB BLD-MCNC: 11.3 G/DL (ref 12–16)
MCH RBC QN AUTO: 27 PG (ref 27–31)
MCHC RBC AUTO-ENTMCNC: 30.8 G/DL (ref 32–36)
MCV RBC AUTO: 88 FL (ref 82–98)
PLATELET # BLD AUTO: 175 K/UL (ref 150–350)
PMV BLD AUTO: 12.9 FL (ref 9.2–12.9)
POTASSIUM SERPL-SCNC: 4.2 MMOL/L (ref 3.5–5.1)
PROT SERPL-MCNC: 6 G/DL (ref 6–8.4)
RBC # BLD AUTO: 4.18 M/UL (ref 4–5.4)
SODIUM SERPL-SCNC: 136 MMOL/L (ref 136–145)
WBC # BLD AUTO: 9.53 K/UL (ref 3.9–12.7)

## 2019-06-18 PROCEDURE — 99999 PR PBB SHADOW E&M-EST. PATIENT-LVL II: CPT | Mod: PBBFAC,,, | Performed by: OBSTETRICS & GYNECOLOGY

## 2019-06-18 PROCEDURE — 99999 PR PBB SHADOW E&M-EST. PATIENT-LVL II: ICD-10-PCS | Mod: PBBFAC,,, | Performed by: OBSTETRICS & GYNECOLOGY

## 2019-06-18 PROCEDURE — 85027 COMPLETE CBC AUTOMATED: CPT

## 2019-06-18 PROCEDURE — 36415 COLL VENOUS BLD VENIPUNCTURE: CPT | Mod: PO

## 2019-06-18 PROCEDURE — 0502F SUBSEQUENT PRENATAL CARE: CPT | Mod: ,,, | Performed by: OBSTETRICS & GYNECOLOGY

## 2019-06-18 PROCEDURE — 0502F PR SUBSEQUENT PRENATAL CARE: ICD-10-PCS | Mod: ,,, | Performed by: OBSTETRICS & GYNECOLOGY

## 2019-06-18 PROCEDURE — 80053 COMPREHEN METABOLIC PANEL: CPT

## 2019-06-18 NOTE — PROGRESS NOTES
Complaints today: swelling, no headache  Good fetal movements  FBS= 100s - high 90s  Normal postprandials    BP (!) 142/66   Wt 116.1 kg (255 lb 15.3 oz)   LMP 2018 (Exact Date)   BMI 41.31 kg/m²     29 y.o., at 36w3d by Estimated Date of Delivery: 19  Patient Active Problem List   Diagnosis    ASCUS (atypical squamous cells of undetermined significance) on Pap smear    Encounter for supervision of normal first pregnancy in first trimester    Diet controlled gestational diabetes mellitus in third trimester     OB History    Para Term  AB Living   2 1 1     1   SAB TAB Ectopic Multiple Live Births           1      # Outcome Date GA Lbr Kelby/2nd Weight Sex Delivery Anes PTL Lv   2 Current            1 Term 16 40w0d  3.912 kg (8 lb 10 oz) F CS-LTranv EPI N JFEFREY      Complications: Failure to Progress in First Stage       Dating reviewed    Allergies and problem list reviewed and updated    Medical and surgical history reviewed    Prenatal labs reviewed and updated    Physical Exam:  ABD: soft, gravid, nontender    Assessment:  36 weeks, HTN, diabetes    Plan:   Screening labs  U/s at follow up  Twice daily kick counts  Weekly NST   follow up 1 Weeks,  kick counts, labor precautions

## 2019-06-18 NOTE — PROCEDURES
Procedures     FETAL SURVEILLANCE TESTING SUMMARY    INDICATIONS:  gestational diabetes mellitus    Fetal doppler heart rate tracing obtained in the usual fashion with the patient in the left supine position.    Duration of monitorinmin    OBJECTIVE RESULTS:  Baseline fetal heart rate: 140bpm    Fetal heart variability: moderate  Fetal Heart Rate decelerations: none  Fetal acoustic stimulator: no  Fetal Heart Rate accelerations: yes  Fetal Non-stress Test: reactive    Fetal surveillance: reassuring        Jose Hood M.D., FACOG

## 2019-06-24 ENCOUNTER — PATIENT MESSAGE (OUTPATIENT)
Dept: OBSTETRICS AND GYNECOLOGY | Facility: CLINIC | Age: 29
End: 2019-06-24

## 2019-06-24 ENCOUNTER — HOSPITAL ENCOUNTER (OUTPATIENT)
Dept: RADIOLOGY | Facility: HOSPITAL | Age: 29
Discharge: HOME | End: 2019-06-24
Attending: OBSTETRICS & GYNECOLOGY
Payer: COMMERCIAL

## 2019-06-24 DIAGNOSIS — O12.03 GESTATIONAL EDEMA IN THIRD TRIMESTER: ICD-10-CM

## 2019-06-24 DIAGNOSIS — Z3A.36 36 WEEKS GESTATION OF PREGNANCY: ICD-10-CM

## 2019-06-24 PROCEDURE — 76815 OB US LIMITED FETUS(S): CPT | Mod: 26,,, | Performed by: RADIOLOGY

## 2019-06-24 PROCEDURE — 76815 US OB LIMITED 1 OR MORE GESTATIONS: ICD-10-PCS | Mod: 26,,, | Performed by: RADIOLOGY

## 2019-06-24 PROCEDURE — 76815 OB US LIMITED FETUS(S): CPT | Mod: TC,PN

## 2019-06-25 ENCOUNTER — ROUTINE PRENATAL (OUTPATIENT)
Dept: OBSTETRICS AND GYNECOLOGY | Facility: CLINIC | Age: 29
End: 2019-06-25
Payer: COMMERCIAL

## 2019-06-25 VITALS — DIASTOLIC BLOOD PRESSURE: 72 MMHG | BODY MASS INDEX: 41.56 KG/M2 | SYSTOLIC BLOOD PRESSURE: 126 MMHG | WEIGHT: 257.5 LBS

## 2019-06-25 DIAGNOSIS — O24.410 DIET CONTROLLED GESTATIONAL DIABETES MELLITUS IN THIRD TRIMESTER: ICD-10-CM

## 2019-06-25 DIAGNOSIS — Z3A.37 37 WEEKS GESTATION OF PREGNANCY: Primary | ICD-10-CM

## 2019-06-25 PROCEDURE — 59025 FETAL NON-STRESS TEST: CPT | Mod: S$GLB,,, | Performed by: OBSTETRICS & GYNECOLOGY

## 2019-06-25 PROCEDURE — 99999 PR PBB SHADOW E&M-EST. PATIENT-LVL III: CPT | Mod: PBBFAC,,, | Performed by: OBSTETRICS & GYNECOLOGY

## 2019-06-25 PROCEDURE — 0502F PR SUBSEQUENT PRENATAL CARE: ICD-10-PCS | Mod: ,,, | Performed by: OBSTETRICS & GYNECOLOGY

## 2019-06-25 PROCEDURE — 0502F SUBSEQUENT PRENATAL CARE: CPT | Mod: ,,, | Performed by: OBSTETRICS & GYNECOLOGY

## 2019-06-25 PROCEDURE — 99999 PR PBB SHADOW E&M-EST. PATIENT-LVL III: ICD-10-PCS | Mod: PBBFAC,,, | Performed by: OBSTETRICS & GYNECOLOGY

## 2019-06-25 PROCEDURE — 59025 PR FETAL 2N-STRESS TEST: ICD-10-PCS | Mod: S$GLB,,, | Performed by: OBSTETRICS & GYNECOLOGY

## 2019-06-25 RX ORDER — METHYLDOPA 250 MG/1
250 TABLET, FILM COATED ORAL EVERY 12 HOURS
Qty: 180 TABLET | Refills: 3 | Status: SHIPPED | OUTPATIENT
Start: 2019-06-25 | End: 2019-08-16

## 2019-06-25 NOTE — PROCEDURES
Procedures       FETAL SURVEILLANCE TESTING SUMMARY    INDICATIONS:  gestational diabetes mellitus    Fetal doppler heart rate tracing obtained in the usual fashion with the patient in the left supine position.    Duration of monitorinsmin    OBJECTIVE RESULTS:  Baseline fetal heart rate: 140bpm    Fetal heart variability: moderate  Fetal Heart Rate decelerations: none  Fetal acoustic stimulator: no  Fetal Heart Rate accelerations: yes  Fetal Non-stress Test: reactive  Uterine irritability: no    Fetal surveillance: reassuring        Jose Hood M.D., FACOG

## 2019-06-25 NOTE — PROGRESS NOTES
Complaints today: no headache, good fetal movements  BS: fasting 80s-90s, normal postprandials    BP (!) 142/90   Wt 116.8 kg (257 lb 8 oz)   LMP 2018 (Exact Date)   BMI 41.56 kg/m²     29 y.o., at 37w3d by Estimated Date of Delivery: 19  Patient Active Problem List   Diagnosis    ASCUS (atypical squamous cells of undetermined significance) on Pap smear    Encounter for supervision of normal first pregnancy in first trimester    Diet controlled gestational diabetes mellitus in third trimester     OB History    Para Term  AB Living   2 1 1     1   SAB TAB Ectopic Multiple Live Births           1      # Outcome Date GA Lbr Kelby/2nd Weight Sex Delivery Anes PTL Lv   2 Current            1 Term 16 40w0d  3.912 kg (8 lb 10 oz) F CS-LTranv EPI N JEFFREY      Complications: Failure to Progress in First Stage       Dating reviewed    Allergies and problem list reviewed and updated    Medical and surgical history reviewed    Prenatal labs reviewed and updated    Physical Exam:  ABD: soft, gravid, nontender    Assessment:  37 weeks, doing well    Plan:    follow up 1 Weeks,  kick counts, labor precautions   NST weekly, rpeeat C/section at 39 weeks,

## 2019-06-28 ENCOUNTER — TELEPHONE (OUTPATIENT)
Dept: OBSTETRICS AND GYNECOLOGY | Facility: CLINIC | Age: 29
End: 2019-06-28

## 2019-06-28 NOTE — TELEPHONE ENCOUNTER
Spoke with pt who reports two days of blood pressure readings in the 140/90's consistently. Mild headache. Denies dizziness, swelling in hands or feet, vaginal bleeding. Reports good fetal movement.     Per Dr. Hood pt to to increase aldomet to 500mg BID.   Advised to go to L&D for BP over weekend if symptoms worsen. She voiced understanding.

## 2019-07-01 ENCOUNTER — ROUTINE PRENATAL (OUTPATIENT)
Dept: OBSTETRICS AND GYNECOLOGY | Facility: CLINIC | Age: 29
End: 2019-07-01
Payer: COMMERCIAL

## 2019-07-01 VITALS — WEIGHT: 257.5 LBS | BODY MASS INDEX: 41.56 KG/M2 | SYSTOLIC BLOOD PRESSURE: 136 MMHG | DIASTOLIC BLOOD PRESSURE: 88 MMHG

## 2019-07-01 DIAGNOSIS — Z3A.38 38 WEEKS GESTATION OF PREGNANCY: Primary | ICD-10-CM

## 2019-07-01 PROCEDURE — 99999 PR PBB SHADOW E&M-EST. PATIENT-LVL II: CPT | Mod: PBBFAC,,, | Performed by: OBSTETRICS & GYNECOLOGY

## 2019-07-01 PROCEDURE — 0502F SUBSEQUENT PRENATAL CARE: CPT | Mod: CPTII,S$GLB,, | Performed by: OBSTETRICS & GYNECOLOGY

## 2019-07-01 PROCEDURE — 99999 PR PBB SHADOW E&M-EST. PATIENT-LVL II: ICD-10-PCS | Mod: PBBFAC,,, | Performed by: OBSTETRICS & GYNECOLOGY

## 2019-07-01 PROCEDURE — 0502F PR SUBSEQUENT PRENATAL CARE: ICD-10-PCS | Mod: CPTII,S$GLB,, | Performed by: OBSTETRICS & GYNECOLOGY

## 2019-07-01 NOTE — PROCEDURES
Procedures     FETAL SURVEILLANCE TESTING SUMMARY    INDICATIONS:  pregnancy-induced hypertension    Fetal doppler heart rate tracing obtained in the usual fashion with the patient in the left supine position.    Duration of monitorinmin    OBJECTIVE RESULTS:  Baseline fetal heart rate: 150bpm    Fetal heart variability: moderate  Fetal Heart Rate decelerations: none  Fetal acoustic stimulator: no  Fetal Heart Rate accelerations: yes  Fetal Non-stress Test: reactive  Uterine irritability: no    Fetal surveillance: reassuring        Jose Hood M.D., FACOG

## 2019-07-01 NOTE — PROGRESS NOTES
Complaints today: none, counseled.       /88   Wt 116.8 kg (257 lb 8 oz)   LMP 2018 (Exact Date)   BMI 41.56 kg/m²     29 y.o., at 38w2d by Estimated Date of Delivery: 19  Patient Active Problem List   Diagnosis    ASCUS (atypical squamous cells of undetermined significance) on Pap smear    Encounter for supervision of normal first pregnancy in first trimester    Diet controlled gestational diabetes mellitus in third trimester     OB History    Para Term  AB Living   2 1 1     1   SAB TAB Ectopic Multiple Live Births           1      # Outcome Date GA Lbr Kelby/2nd Weight Sex Delivery Anes PTL Lv   2 Current            1 Term 16 40w0d  3.912 kg (8 lb 10 oz) F CS-LTranv EPI N JEFFREY      Complications: Failure to Progress in First Stage       Dating reviewed    Allergies and problem list reviewed and updated    Medical and surgical history reviewed    Prenatal labs reviewed and updated    Physical Exam:  ABD: soft, gravid, nontender    nst TODAY  Assessment:  38 weeks,doing well  HTN, diabetes    Plan:    follow up 1 Weeks, kick counts, labor precautions   Repeat c/section 2019

## 2019-07-06 PROBLEM — Z3A.39 39 WEEKS GESTATION OF PREGNANCY: Status: ACTIVE | Noted: 2019-07-06

## 2019-07-12 ENCOUNTER — PATIENT MESSAGE (OUTPATIENT)
Dept: OBSTETRICS AND GYNECOLOGY | Facility: CLINIC | Age: 29
End: 2019-07-12

## 2019-07-15 ENCOUNTER — PATIENT MESSAGE (OUTPATIENT)
Dept: OBSTETRICS AND GYNECOLOGY | Facility: CLINIC | Age: 29
End: 2019-07-15

## 2019-07-15 DIAGNOSIS — N30.90 CYSTITIS: Primary | ICD-10-CM

## 2019-07-16 RX ORDER — NITROFURANTOIN 25; 75 MG/1; MG/1
100 CAPSULE ORAL 2 TIMES DAILY
Qty: 20 CAPSULE | Refills: 0 | Status: SHIPPED | OUTPATIENT
Start: 2019-07-16 | End: 2019-07-26

## 2019-07-17 ENCOUNTER — PATIENT MESSAGE (OUTPATIENT)
Dept: OBSTETRICS AND GYNECOLOGY | Facility: CLINIC | Age: 29
End: 2019-07-17

## 2019-07-22 ENCOUNTER — POSTPARTUM VISIT (OUTPATIENT)
Dept: OBSTETRICS AND GYNECOLOGY | Facility: CLINIC | Age: 29
End: 2019-07-22
Payer: COMMERCIAL

## 2019-07-22 VITALS
BODY MASS INDEX: 37.75 KG/M2 | SYSTOLIC BLOOD PRESSURE: 126 MMHG | DIASTOLIC BLOOD PRESSURE: 84 MMHG | WEIGHT: 233.94 LBS

## 2019-07-22 PROCEDURE — 99999 PR PBB SHADOW E&M-EST. PATIENT-LVL II: ICD-10-PCS | Mod: PBBFAC,,, | Performed by: OBSTETRICS & GYNECOLOGY

## 2019-07-22 PROCEDURE — 99999 PR PBB SHADOW E&M-EST. PATIENT-LVL II: CPT | Mod: PBBFAC,,, | Performed by: OBSTETRICS & GYNECOLOGY

## 2019-07-22 NOTE — PROGRESS NOTES
Patient here for 2 week postpartum exam without complaint. Breast feeding.   She is s/p  Repeat c/section 10#- male  She has no mood complaints.   Minimal lochia.   No pain.    PE:  /84   Wt 106.1 kg (233 lb 14.5 oz)   LMP 09/21/2018 (Exact Date)   Breastfeeding? Yes   BMI 37.75 kg/m²   Constitutional: She is oriented to person, place, and time. She appears well-developed and well-nourished. No distress.  HENT:   Head: Normocephalic and atraumatic.   Eyes: Conjunctivae and EOM are normal. No scleral icterus.   Neck: Normal range of motion. Neck supple. No tracheal deviation present.   Cardiovascular: Normal rate.    Pulmonary/Chest: Effort normal. No respiratory distress. She exhibits no tenderness.  Breasts: deferred  Abdominal: Soft. She exhibits no distension and no mass. There is no tenderness. There is no rebound and no guarding. Incision healing well  Genitourinary: deferred  Musculoskeletal: Normal range of motion.   Neurological: She is alert and oriented to person, place, and time. Coordination normal.   Skin: Skin is warm and dry. She is not diaphoretic.   Psychiatric: She has a normal mood and affect.      Assessment:  2 week interval PP exam, doing well      Plan:  Increase activity  Follow up 4 weeks for pelvic and Birth control

## 2019-08-16 ENCOUNTER — POSTPARTUM VISIT (OUTPATIENT)
Dept: OBSTETRICS AND GYNECOLOGY | Facility: CLINIC | Age: 29
End: 2019-08-16
Payer: COMMERCIAL

## 2019-08-16 VITALS
DIASTOLIC BLOOD PRESSURE: 76 MMHG | WEIGHT: 233.69 LBS | HEIGHT: 66 IN | BODY MASS INDEX: 37.56 KG/M2 | SYSTOLIC BLOOD PRESSURE: 128 MMHG

## 2019-08-16 PROCEDURE — 99999 PR PBB SHADOW E&M-EST. PATIENT-LVL III: ICD-10-PCS | Mod: PBBFAC,,, | Performed by: OBSTETRICS & GYNECOLOGY

## 2019-08-16 PROCEDURE — 99999 PR PBB SHADOW E&M-EST. PATIENT-LVL III: CPT | Mod: PBBFAC,,, | Performed by: OBSTETRICS & GYNECOLOGY

## 2019-08-16 NOTE — PROGRESS NOTES
History of Present Illness:   Pateint presents today  6 weeks postop, status post repeat c/section, with complaint of none, breast feeding.      Past medical and surgical history reviewed.   I have reviewed the patient's medical history in detail and updated the computerized patient record.    Physical exam:  Vital Signs: as above, reviewed.  Constitutional: She is oriented to person, place, and time. She appears well-developed and well-nourished. No distress.   HENT:   Head: Normocephalic and atraumatic.   Eyes: Conjunctivae and EOM are normal. No scleral icterus.   Neck: Normal range of motion. Neck supple. No tracheal deviation present.   Cardiovascular: Normal rate.    Pulmonary/Chest: Effort normal. No respiratory distress. She exhibits no tenderness.  Breasts: deferred, breast feeding  Abdominal: Soft. She exhibits no distension and no mass. The incision is healed well. There is no rebound and no guarding.   Genitourinary:    External rectal exam shows no thrombosed external hemorrhoids.    Pelvic exam was performed with patient supine.   No labial fusion.   There is no rash, lesion or injury on the right labia.   There is no rash, lesion or injury on the left labia.   No bleeding and no signs of injury around the vaginal introitus, healed well, urethra is without lesions and well supported. The cervix is visualized with no discharge, lesions or friability.   No vaginal discharge found.    No significant Cystocele, Enterocele or rectocele, and uterus well supported.   Bimanual exam:   The urethra is normal to palpation and there are no palpable vaginal wall masses.   Uterus is not deviated, not enlarged, not fixed, normal shape and not tender.   Cervix exhibits no motion tenderness.    Right adnexum displays no mass and no tenderness.   Left adnexum displays no mass and no tenderness.  Musculoskeletal: Normal range of motion.   Lymphadenopathy: No inguinal adenopathy present.   Neurological: She is alert and  oriented to person, place, and time. Coordination normal.   Skin: Skin is warm and dry. She is not diaphoretic.   Psychiatric: She has a normal mood and affect.    Assessment:  Normal 6 weeks pp exam    Plan:  Follow up  1 year  Resume normal activity

## 2019-09-13 ENCOUNTER — PATIENT MESSAGE (OUTPATIENT)
Dept: OBSTETRICS AND GYNECOLOGY | Facility: CLINIC | Age: 29
End: 2019-09-13

## 2019-09-16 ENCOUNTER — OFFICE VISIT (OUTPATIENT)
Dept: OBSTETRICS AND GYNECOLOGY | Facility: CLINIC | Age: 29
End: 2019-09-16
Payer: COMMERCIAL

## 2019-09-16 VITALS
BODY MASS INDEX: 37.67 KG/M2 | RESPIRATION RATE: 18 BRPM | DIASTOLIC BLOOD PRESSURE: 80 MMHG | HEIGHT: 66 IN | WEIGHT: 234.38 LBS | SYSTOLIC BLOOD PRESSURE: 122 MMHG

## 2019-09-16 DIAGNOSIS — F41.9 ANXIETY: ICD-10-CM

## 2019-09-16 PROCEDURE — 99213 PR OFFICE/OUTPT VISIT, EST, LEVL III, 20-29 MIN: ICD-10-PCS | Mod: 24,S$GLB,, | Performed by: OBSTETRICS & GYNECOLOGY

## 2019-09-16 PROCEDURE — 99213 OFFICE O/P EST LOW 20 MIN: CPT | Mod: 24,S$GLB,, | Performed by: OBSTETRICS & GYNECOLOGY

## 2019-09-16 PROCEDURE — 3008F PR BODY MASS INDEX (BMI) DOCUMENTED: ICD-10-PCS | Mod: CPTII,S$GLB,, | Performed by: OBSTETRICS & GYNECOLOGY

## 2019-09-16 PROCEDURE — 99999 PR PBB SHADOW E&M-EST. PATIENT-LVL III: CPT | Mod: PBBFAC,,, | Performed by: OBSTETRICS & GYNECOLOGY

## 2019-09-16 PROCEDURE — 99999 PR PBB SHADOW E&M-EST. PATIENT-LVL III: ICD-10-PCS | Mod: PBBFAC,,, | Performed by: OBSTETRICS & GYNECOLOGY

## 2019-09-16 PROCEDURE — 3008F BODY MASS INDEX DOCD: CPT | Mod: CPTII,S$GLB,, | Performed by: OBSTETRICS & GYNECOLOGY

## 2019-09-16 RX ORDER — AMLODIPINE BESYLATE 5 MG/1
5 TABLET ORAL DAILY
COMMUNITY
Start: 2019-09-13

## 2019-09-16 RX ORDER — SERTRALINE HYDROCHLORIDE 100 MG/1
100 TABLET, FILM COATED ORAL DAILY
Qty: 30 TABLET | Refills: 6 | Status: SHIPPED | OUTPATIENT
Start: 2019-09-16 | End: 2020-03-12

## 2019-09-16 RX ORDER — HYDROXYZINE HYDROCHLORIDE 50 MG/1
50 TABLET, FILM COATED ORAL 3 TIMES DAILY PRN
Qty: 30 TABLET | Refills: 1 | Status: SHIPPED | OUTPATIENT
Start: 2019-09-16 | End: 2019-09-26

## 2019-09-16 NOTE — PROGRESS NOTES
"Chief Complaint   Patient presents with    Postpartum Care     postpartum depression       History of Present Illness   29 y.o.  female patient presents today for postpartum depression, anxiety - mostly about breast feeding. No Suicidal or homicidal Ideation.     Past medical and surgical history reviewed.   I have reviewed the patient's medical history in detail and updated the computerized patient record.    Review of patient's allergies indicates:   Allergen Reactions    No known allergies          Review of Systems - Negative except HPI  GEN ROS: negative for - chills or fever  Breast ROS: negative for breast lumps  Genito-Urinary ROS: no dysuria, trouble voiding, or hematuria      Physical Examination:  /80   Resp 18   Ht 5' 6" (1.676 m)   Wt 106.3 kg (234 lb 5.6 oz)   Breastfeeding? Yes   BMI 37.82 kg/m²    Constitutional: She appears alert and responsive. She appears well-developed, well-groomed, and well-nourished. No distress. OverWeight   Psychiatric: She has a depressed mood and affect, oriented to person, place, and time. No Suicidal or homicidal Ideation               Assessment:  1. Postpartum depression  sertraline (ZOLOFT) 100 MG tablet    hydrOXYzine (ATARAX) 50 MG tablet   2. Anxiety  sertraline (ZOLOFT) 100 MG tablet    hydrOXYzine (ATARAX) 50 MG tablet       Plan:  Zoloft, atarax prn  follow up 2 weeks or as needed  Patient informed will be contacted with results within 2 weeks. Encouraged to please call back or email if she has not heard from us by then.        "

## 2020-03-12 DIAGNOSIS — F41.9 ANXIETY: ICD-10-CM

## 2020-03-12 RX ORDER — SERTRALINE HYDROCHLORIDE 100 MG/1
TABLET, FILM COATED ORAL
Qty: 90 TABLET | Refills: 0 | Status: SHIPPED | OUTPATIENT
Start: 2020-03-12 | End: 2020-06-07

## 2020-06-15 PROBLEM — Z3A.39 39 WEEKS GESTATION OF PREGNANCY: Status: RESOLVED | Noted: 2019-07-06 | Resolved: 2020-06-15

## 2020-07-15 DIAGNOSIS — F41.9 ANXIETY: ICD-10-CM

## 2020-07-15 RX ORDER — SERTRALINE HYDROCHLORIDE 100 MG/1
100 TABLET, FILM COATED ORAL DAILY
Qty: 90 TABLET | Refills: 3 | Status: SHIPPED | OUTPATIENT
Start: 2020-07-15 | End: 2021-08-27

## 2020-07-15 NOTE — TELEPHONE ENCOUNTER
Patient called and requesting prescription refill.  Patient last seen in clinic 9-.  Please advise.  Thank you.

## 2021-04-29 ENCOUNTER — PATIENT MESSAGE (OUTPATIENT)
Dept: RESEARCH | Facility: HOSPITAL | Age: 31
End: 2021-04-29

## 2021-08-15 ENCOUNTER — IMMUNIZATION (OUTPATIENT)
Dept: PRIMARY CARE CLINIC | Facility: CLINIC | Age: 31
End: 2021-08-15

## 2021-08-15 DIAGNOSIS — Z23 NEED FOR VACCINATION: Primary | ICD-10-CM

## 2021-08-15 PROCEDURE — 91303 COVID-19,VECTOR-NR,RS-AD26,PF,0.5 ML DOSE VACCINE (JANSSEN): ICD-10-PCS | Mod: S$GLB,,, | Performed by: INTERNAL MEDICINE

## 2021-08-15 PROCEDURE — 91303 COVID-19,VECTOR-NR,RS-AD26,PF,0.5 ML DOSE VACCINE (JANSSEN): CPT | Mod: S$GLB,,, | Performed by: INTERNAL MEDICINE

## 2021-08-15 PROCEDURE — 0031A COVID-19,VECTOR-NR,RS-AD26,PF,0.5 ML DOSE VACCINE (JANSSEN): CPT | Mod: CV19,S$GLB,, | Performed by: INTERNAL MEDICINE

## 2021-08-15 PROCEDURE — 0031A COVID-19,VECTOR-NR,RS-AD26,PF,0.5 ML DOSE VACCINE (JANSSEN): ICD-10-PCS | Mod: CV19,S$GLB,, | Performed by: INTERNAL MEDICINE

## 2022-05-10 ENCOUNTER — OFFICE VISIT (OUTPATIENT)
Dept: OBSTETRICS AND GYNECOLOGY | Facility: CLINIC | Age: 32
End: 2022-05-10
Payer: COMMERCIAL

## 2022-05-10 ENCOUNTER — LAB VISIT (OUTPATIENT)
Dept: LAB | Facility: HOSPITAL | Age: 32
End: 2022-05-10
Attending: OBSTETRICS & GYNECOLOGY
Payer: COMMERCIAL

## 2022-05-10 VITALS — BODY MASS INDEX: 35.43 KG/M2 | RESPIRATION RATE: 18 BRPM | HEIGHT: 66 IN | WEIGHT: 220.44 LBS

## 2022-05-10 DIAGNOSIS — N92.0 MENORRHAGIA WITH REGULAR CYCLE: ICD-10-CM

## 2022-05-10 DIAGNOSIS — Z01.419 ENCOUNTER FOR ROUTINE GYNECOLOGICAL EXAMINATION WITH PAPANICOLAOU SMEAR OF CERVIX: Primary | ICD-10-CM

## 2022-05-10 LAB
ERYTHROCYTE [DISTWIDTH] IN BLOOD BY AUTOMATED COUNT: 12.3 % (ref 11.5–14.5)
FSH SERPL-ACNC: 5.09 MIU/ML
HCT VFR BLD AUTO: 46 % (ref 37–48.5)
HGB BLD-MCNC: 14.8 G/DL (ref 12–16)
IRON SERPL-MCNC: 117 UG/DL (ref 30–160)
MCH RBC QN AUTO: 27 PG (ref 27–31)
MCHC RBC AUTO-ENTMCNC: 32.2 G/DL (ref 32–36)
MCV RBC AUTO: 84 FL (ref 82–98)
PLATELET # BLD AUTO: 226 K/UL (ref 150–450)
PMV BLD AUTO: 10.6 FL (ref 9.2–12.9)
RBC # BLD AUTO: 5.48 M/UL (ref 4–5.4)
SATURATED IRON: 29 % (ref 20–50)
TOTAL IRON BINDING CAPACITY: 397 UG/DL (ref 250–450)
TRANSFERRIN SERPL-MCNC: 268 MG/DL (ref 200–375)
TSH SERPL DL<=0.005 MIU/L-ACNC: 1.34 UIU/ML (ref 0.4–4)
WBC # BLD AUTO: 6.43 K/UL (ref 3.9–12.7)

## 2022-05-10 PROCEDURE — 3008F PR BODY MASS INDEX (BMI) DOCUMENTED: ICD-10-PCS | Mod: CPTII,S$GLB,, | Performed by: OBSTETRICS & GYNECOLOGY

## 2022-05-10 PROCEDURE — 99395 PREV VISIT EST AGE 18-39: CPT | Mod: S$GLB,,, | Performed by: OBSTETRICS & GYNECOLOGY

## 2022-05-10 PROCEDURE — 99999 PR PBB SHADOW E&M-EST. PATIENT-LVL III: ICD-10-PCS | Mod: PBBFAC,,, | Performed by: OBSTETRICS & GYNECOLOGY

## 2022-05-10 PROCEDURE — 84443 ASSAY THYROID STIM HORMONE: CPT | Performed by: OBSTETRICS & GYNECOLOGY

## 2022-05-10 PROCEDURE — 3008F BODY MASS INDEX DOCD: CPT | Mod: CPTII,S$GLB,, | Performed by: OBSTETRICS & GYNECOLOGY

## 2022-05-10 PROCEDURE — 99999 PR PBB SHADOW E&M-EST. PATIENT-LVL III: CPT | Mod: PBBFAC,,, | Performed by: OBSTETRICS & GYNECOLOGY

## 2022-05-10 PROCEDURE — 87624 HPV HI-RISK TYP POOLED RSLT: CPT | Performed by: OBSTETRICS & GYNECOLOGY

## 2022-05-10 PROCEDURE — 83001 ASSAY OF GONADOTROPIN (FSH): CPT | Performed by: OBSTETRICS & GYNECOLOGY

## 2022-05-10 PROCEDURE — 88175 CYTOPATH C/V AUTO FLUID REDO: CPT | Performed by: OBSTETRICS & GYNECOLOGY

## 2022-05-10 PROCEDURE — 85027 COMPLETE CBC AUTOMATED: CPT | Performed by: OBSTETRICS & GYNECOLOGY

## 2022-05-10 PROCEDURE — 36415 COLL VENOUS BLD VENIPUNCTURE: CPT | Mod: PN | Performed by: OBSTETRICS & GYNECOLOGY

## 2022-05-10 PROCEDURE — 99395 PR PREVENTIVE VISIT,EST,18-39: ICD-10-PCS | Mod: S$GLB,,, | Performed by: OBSTETRICS & GYNECOLOGY

## 2022-05-10 PROCEDURE — 84466 ASSAY OF TRANSFERRIN: CPT | Performed by: OBSTETRICS & GYNECOLOGY

## 2022-05-10 RX ORDER — PEDIATRIC MULTIVITAMIN NO.238
TABLET,CHEWABLE ORAL
COMMUNITY

## 2022-05-10 NOTE — PROGRESS NOTES
Chief Complaint   Patient presents with    Well Woman    Menorrhagia     Heavier than usual x 1 year       History and Physical:  Patient's last menstrual period was 2022.       Frances Wilkes is a 31 y.o.   female who presents today for her routine annual GYN exam. The patient has no Gynecology complaints today. Heavier menses first 2 of 7 day cycles, low libido, vag dryness, counseled.       Allergies:   Review of patient's allergies indicates:   Allergen Reactions    No known allergies        Past Medical History:   Diagnosis Date    Abnormal Pap smear     Asthma     allergy induced    Gestational diabetes mellitus (GDM) in third trimester 2019    Hypertension     Maternal care due to low transverse uterine scar from previous  delivery     Ovarian cyst     Seasonal allergies        Past Surgical History:   Procedure Laterality Date     SECTION       SECTION N/A 2019    Procedure:  SECTION;  Surgeon: Jose Hood MD;  Location: [facility];  Service: OB/GYN;  Laterality: N/A;       MEDS:   Current Outpatient Medications on File Prior to Visit   Medication Sig Dispense Refill    albuterol (PROVENTIL/VENTOLIN HFA) 90 mcg/actuation inhaler Every 4 hours PRN  BRING TO HOSPITAL; USE IN AM IF NEEDED      amLODIPine (NORVASC) 5 MG tablet Take 5 mg by mouth once daily.      multivit with min-folic acid 200 mcg Chew Take by mouth.      beclomethasone (QVAR) 40 mcg/actuation Aero Inhale 1 puff into the lungs 2 (two) times daily as needed. Controller   USE IF NEEDED      PRENATAL VIT W-CA,FE,FA,<1 MG, (PRENATAL VITAMIN ORAL) Take by mouth.      sertraline (ZOLOFT) 100 MG tablet TAKE 1 TABLET BY MOUTH EVERY DAY 90 tablet 3     No current facility-administered medications on file prior to visit.       OB History        2    Para   2    Term   2            AB        Living   2       SAB        IAB        Ectopic        Multiple   0  "   Live Births   2                 Social History     Socioeconomic History    Marital status:    Tobacco Use    Smoking status: Never Smoker    Smokeless tobacco: Never Used   Substance and Sexual Activity    Alcohol use: Not Currently     Comment: socially     Drug use: No    Sexual activity: Yes     Partners: Male     Birth control/protection: None       Family History   Problem Relation Age of Onset    Diabetes Paternal Grandmother     Heart murmur Maternal Grandfather     Gestational diabetes Mother     Breast cancer Maternal Aunt     Breast cancer Maternal Grandmother     Hypertension Father          Past medical and surgical history reviewed.   I have reviewed the patient's medical history in detail and updated the computerized patient record.        Review of System:   General: no chills, fever, night sweats, weight gain or weight loss  Psychological: no depression or suicidal ideation  Breasts: no new or changing breast lumps, nipple discharge or masses.  Respiratory: no cough, shortness of breath, or wheezing  Cardiovascular: no chest pain or dyspnea on exertion  Gastrointestinal: no abdominal pain, change in bowel habits, or black or bloody stools  Genito-Urinary: no incontinence, urinary frequency/urgency or vulvar/vaginal symptoms, pelvic pain or abnormal vaginal bleeding.  Musculoskeletal: no gait disturbance or muscular weakness      Physical Exam:   Resp 18   Ht 5' 6" (1.676 m)   Wt 100 kg (220 lb 7.4 oz)   LMP 04/26/2022   BMI 35.58 kg/m²   Constitutional: She appears alert and responsive. She appears well-developed, well-groomed, and well-nourished. No distress. OverWeight   HENT:   Head: Normocephalic and atraumatic.   Eyes: Conjunctivae and EOM are normal. No scleral icterus.   Neck: Symmetrical. Normal range of motion. Neck supple. No tracheal deviation present.   Cardiovascular: Normal rate, no rhythm abnormality noted. Extremities without swelling or edema, warm.  "   Pulmonary/Chest: Normal respiratory Effort. No distress or retractions. She exhibits no tenderness.  Breasts: are symmetrical.   Right breast exhibits no inverted nipple, no mass, no nipple discharge, no skin change and no tenderness.   Left breast exhibits no inverted nipple, no mass, no nipple discharge, no skin change and no tenderness.  Abdominal: Soft. She exhibits no distension, hernias or masses. There is no tenderness. No enlargement of liver edge or spleen.  There is no rebound and no guarding.   Genitourinary:    External rectal exam shows no thrombosed external hemorrhoids, no lesions.     Pelvic exam was performed with patient supine.   No labial fusion, and symmetrical.    There is no rash, lesion or injury on the right labia.   There is no rash, lesion or injury on the left labia.   No bleeding and no signs of injury around the vaginal introitus, urethral meatus is normal size and without prolapse or lesions, urethra well supported. The cervix is visualized with no discharge, lesions or friability.   No vaginal discharge found.    No significant Cystocele, Enterocele or rectocele, and cervix and uterus well supported.   Bimanual exam:   The urethra is normal to palpation and there are no palpable vaginal wall masses.   Uterus is not deviated, not enlarged, not fixed, normal shape and not tender.   Cervix exhibits no motion tenderness.    Right adnexum displays no mass or nodularity and no tenderness.   Left adnexum displays no mass or nodularity and no tenderness.  Musculoskeletal: Normal range of motion.   Lymphadenopathy: No inguinal adenopathy present.   Neurological: She is alert and oriented to person, place, and time. Coordination normal.   Skin: Skin is warm and dry. She is not diaphoretic. No rashes, lesions or ulcers.   Psychiatric: She has a normal mood and affect, oriented to person, place, and time.      Assessment:   Normal annual GYN exam  1. Encounter for routine gynecological  examination with Papanicolaou smear of cervix  Liquid-Based Pap Smear, Screening    HPV High Risk Genotypes, PCR   2. Menorrhagia with regular cycle     family h/o breast cancer- neg genetics    Plan:   PAP  Mammogram at 33  Referral in for genetic testing not needed   Follow up in 1 year.  Patient informed will be contacted with results within 2 weeks. Encouraged to please call back or email if she has not heard from us by then.

## 2022-05-13 LAB
FINAL PATHOLOGIC DIAGNOSIS: NORMAL
HPV HR 12 DNA SPEC QL NAA+PROBE: NEGATIVE
HPV16 AG SPEC QL: NEGATIVE
HPV18 DNA SPEC QL NAA+PROBE: NEGATIVE
Lab: NORMAL

## 2023-04-12 ENCOUNTER — OFFICE VISIT (OUTPATIENT)
Dept: OPTOMETRY | Facility: CLINIC | Age: 33
End: 2023-04-12
Payer: COMMERCIAL

## 2023-04-12 ENCOUNTER — TELEPHONE (OUTPATIENT)
Dept: OPTOMETRY | Facility: CLINIC | Age: 33
End: 2023-04-12

## 2023-04-12 DIAGNOSIS — E11.9 TYPE 2 DIABETES MELLITUS WITHOUT RETINOPATHY: Primary | ICD-10-CM

## 2023-04-12 DIAGNOSIS — H52.13 MYOPIA OF BOTH EYES WITH ASTIGMATISM: ICD-10-CM

## 2023-04-12 DIAGNOSIS — H52.203 MYOPIA OF BOTH EYES WITH ASTIGMATISM: ICD-10-CM

## 2023-04-12 PROCEDURE — 99999 PR PBB SHADOW E&M-EST. PATIENT-LVL III: ICD-10-PCS | Mod: PBBFAC,,, | Performed by: OPTOMETRIST

## 2023-04-12 PROCEDURE — 92004 PR EYE EXAM, NEW PATIENT,COMPREHESV: ICD-10-PCS | Mod: S$GLB,,, | Performed by: OPTOMETRIST

## 2023-04-12 PROCEDURE — 1160F PR REVIEW ALL MEDS BY PRESCRIBER/CLIN PHARMACIST DOCUMENTED: ICD-10-PCS | Mod: CPTII,S$GLB,, | Performed by: OPTOMETRIST

## 2023-04-12 PROCEDURE — 92004 COMPRE OPH EXAM NEW PT 1/>: CPT | Mod: S$GLB,,, | Performed by: OPTOMETRIST

## 2023-04-12 PROCEDURE — 2023F PR DILATED RETINAL EXAM W/O EVID OF RETINOPATHY: ICD-10-PCS | Mod: CPTII,S$GLB,, | Performed by: OPTOMETRIST

## 2023-04-12 PROCEDURE — 2023F DILAT RTA XM W/O RTNOPTHY: CPT | Mod: CPTII,S$GLB,, | Performed by: OPTOMETRIST

## 2023-04-12 PROCEDURE — 99999 PR PBB SHADOW E&M-EST. PATIENT-LVL III: CPT | Mod: PBBFAC,,, | Performed by: OPTOMETRIST

## 2023-04-12 PROCEDURE — 1160F RVW MEDS BY RX/DR IN RCRD: CPT | Mod: CPTII,S$GLB,, | Performed by: OPTOMETRIST

## 2023-04-12 PROCEDURE — 1159F MED LIST DOCD IN RCRD: CPT | Mod: CPTII,S$GLB,, | Performed by: OPTOMETRIST

## 2023-04-12 PROCEDURE — 1159F PR MEDICATION LIST DOCUMENTED IN MEDICAL RECORD: ICD-10-PCS | Mod: CPTII,S$GLB,, | Performed by: OPTOMETRIST

## 2023-04-12 RX ORDER — LANCETS 30 GAUGE
EACH MISCELLANEOUS
COMMUNITY
Start: 2023-03-28

## 2023-04-12 RX ORDER — METFORMIN HYDROCHLORIDE 500 MG/1
500 TABLET, EXTENDED RELEASE ORAL 2 TIMES DAILY
COMMUNITY
Start: 2023-03-29

## 2023-04-12 RX ORDER — LANCETS
EACH MISCELLANEOUS
COMMUNITY
Start: 2023-03-28

## 2023-04-12 RX ORDER — TIRZEPATIDE 2.5 MG/.5ML
INJECTION, SOLUTION SUBCUTANEOUS
COMMUNITY
Start: 2023-03-28

## 2023-04-12 RX ORDER — BLOOD SUGAR DIAGNOSTIC
STRIP MISCELLANEOUS
COMMUNITY
Start: 2023-03-28

## 2023-04-12 NOTE — PROGRESS NOTES
HPI    New pt here for annual DM eye exam DLS- 10 years ago     Pt states her vision has been changing, has a hard time seeing things at a   distance. 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 Was recently dx with diabetes and has been working to get it   stable, HTN is well controlled on meds.   Pt states for 6 months she has been having occasional twitching OU.   Occasional floaters, no FOL.   Pt is not using GTTS.     Last A1C was in march 12.5  Last edited by Susan Foote on 4/12/2023  1:06 PM.            Assessment /Plan     For exam results, see Encounter Report.    Type 2 diabetes mellitus without retinopathy    Myopia of both eyes with astigmatism      No diabetic retinopathy, no csme. Return in 1 year for dilated eye exam.   2. Hold off on spec rx, recheck in 3 mos.

## 2023-07-24 LAB — HBA1C MFR BLD: 5.3 % (ref 4–6)

## 2023-11-22 ENCOUNTER — OFFICE VISIT (OUTPATIENT)
Dept: OBSTETRICS AND GYNECOLOGY | Facility: CLINIC | Age: 33
End: 2023-11-22
Payer: COMMERCIAL

## 2023-11-22 VITALS
BODY MASS INDEX: 34.86 KG/M2 | HEIGHT: 66 IN | WEIGHT: 216.94 LBS | DIASTOLIC BLOOD PRESSURE: 62 MMHG | SYSTOLIC BLOOD PRESSURE: 132 MMHG

## 2023-11-22 DIAGNOSIS — Z12.31 BREAST CANCER SCREENING BY MAMMOGRAM: ICD-10-CM

## 2023-11-22 DIAGNOSIS — Z01.419 GYNECOLOGIC EXAM NORMAL: Primary | ICD-10-CM

## 2023-11-22 PROCEDURE — 1159F PR MEDICATION LIST DOCUMENTED IN MEDICAL RECORD: ICD-10-PCS | Mod: CPTII,S$GLB,, | Performed by: OBSTETRICS & GYNECOLOGY

## 2023-11-22 PROCEDURE — 4010F ACE/ARB THERAPY RXD/TAKEN: CPT | Mod: CPTII,S$GLB,, | Performed by: OBSTETRICS & GYNECOLOGY

## 2023-11-22 PROCEDURE — 99999 PR PBB SHADOW E&M-EST. PATIENT-LVL IV: CPT | Mod: PBBFAC,,, | Performed by: OBSTETRICS & GYNECOLOGY

## 2023-11-22 PROCEDURE — 88175 CYTOPATH C/V AUTO FLUID REDO: CPT | Performed by: OBSTETRICS & GYNECOLOGY

## 2023-11-22 PROCEDURE — 3078F PR MOST RECENT DIASTOLIC BLOOD PRESSURE < 80 MM HG: ICD-10-PCS | Mod: CPTII,S$GLB,, | Performed by: OBSTETRICS & GYNECOLOGY

## 2023-11-22 PROCEDURE — 4010F PR ACE/ARB THEARPY RXD/TAKEN: ICD-10-PCS | Mod: CPTII,S$GLB,, | Performed by: OBSTETRICS & GYNECOLOGY

## 2023-11-22 PROCEDURE — 1159F MED LIST DOCD IN RCRD: CPT | Mod: CPTII,S$GLB,, | Performed by: OBSTETRICS & GYNECOLOGY

## 2023-11-22 PROCEDURE — 99395 PR PREVENTIVE VISIT,EST,18-39: ICD-10-PCS | Mod: S$GLB,,, | Performed by: OBSTETRICS & GYNECOLOGY

## 2023-11-22 PROCEDURE — 3008F PR BODY MASS INDEX (BMI) DOCUMENTED: ICD-10-PCS | Mod: CPTII,S$GLB,, | Performed by: OBSTETRICS & GYNECOLOGY

## 2023-11-22 PROCEDURE — 99395 PREV VISIT EST AGE 18-39: CPT | Mod: S$GLB,,, | Performed by: OBSTETRICS & GYNECOLOGY

## 2023-11-22 PROCEDURE — 3078F DIAST BP <80 MM HG: CPT | Mod: CPTII,S$GLB,, | Performed by: OBSTETRICS & GYNECOLOGY

## 2023-11-22 PROCEDURE — 3044F PR MOST RECENT HEMOGLOBIN A1C LEVEL <7.0%: ICD-10-PCS | Mod: CPTII,S$GLB,, | Performed by: OBSTETRICS & GYNECOLOGY

## 2023-11-22 PROCEDURE — 3075F PR MOST RECENT SYSTOLIC BLOOD PRESS GE 130-139MM HG: ICD-10-PCS | Mod: CPTII,S$GLB,, | Performed by: OBSTETRICS & GYNECOLOGY

## 2023-11-22 PROCEDURE — 3008F BODY MASS INDEX DOCD: CPT | Mod: CPTII,S$GLB,, | Performed by: OBSTETRICS & GYNECOLOGY

## 2023-11-22 PROCEDURE — 3075F SYST BP GE 130 - 139MM HG: CPT | Mod: CPTII,S$GLB,, | Performed by: OBSTETRICS & GYNECOLOGY

## 2023-11-22 PROCEDURE — 99999 PR PBB SHADOW E&M-EST. PATIENT-LVL IV: ICD-10-PCS | Mod: PBBFAC,,, | Performed by: OBSTETRICS & GYNECOLOGY

## 2023-11-22 PROCEDURE — 3044F HG A1C LEVEL LT 7.0%: CPT | Mod: CPTII,S$GLB,, | Performed by: OBSTETRICS & GYNECOLOGY

## 2023-11-22 RX ORDER — LOSARTAN POTASSIUM 50 MG/1
50 TABLET ORAL
COMMUNITY
Start: 2023-11-20

## 2023-11-22 NOTE — PROGRESS NOTES
Chief Complaint   Patient presents with    Well Woman       History and Physical:  Patient's last menstrual period was 11/15/2023 (approximate).       Frances Wilkes is a 33 y.o.   female who presents today for her routine annual GYN exam. The patient has no Gynecology complaints today. No bowel or bladder complaints.       Allergies:   Review of patient's allergies indicates:   Allergen Reactions    No known allergies        Past Medical History:   Diagnosis Date    Abnormal Pap smear     Asthma     allergy induced    Gestational diabetes mellitus (GDM) in third trimester 2019    Hypertension     Maternal care due to low transverse uterine scar from previous  delivery     Ovarian cyst     Seasonal allergies        Past Surgical History:   Procedure Laterality Date     SECTION       SECTION N/A 2019    Procedure:  SECTION;  Surgeon: Jose Hood MD;  Location: [facility];  Service: OB/GYN;  Laterality: N/A;       MEDS:   Current Outpatient Medications on File Prior to Visit   Medication Sig Dispense Refill    albuterol (PROVENTIL/VENTOLIN HFA) 90 mcg/actuation inhaler Every 4 hours PRN  BRING TO HOSPITAL; USE IN AM IF NEEDED      losartan (COZAAR) 50 MG tablet Take 50 mg by mouth.      tirzepatide (MOUNJARO) 2.5 mg/0.5 mL PnIj       amLODIPine (NORVASC) 5 MG tablet Take 5 mg by mouth once daily.      beclomethasone (QVAR) 40 mcg/actuation Aero Inhale 1 puff into the lungs 2 (two) times daily as needed. Controller   USE IF NEEDED      metFORMIN (GLUCOPHAGE-XR) 500 MG ER 24hr tablet Take 500 mg by mouth 2 (two) times daily.      multivit with min-folic acid 200 mcg Chew Take by mouth.      ONETOUCH ULTRA TEST Strp TEST ONCE DAILY AND AS NEEDED      ONETOUCH ULTRA2 METER Misc TEST ONCE DAILY AND AS NEEDED      ONETOUCH ULTRASOFT LANCETS lancets TEST EVERY DAY AND AS NEEDED      PRENATAL VIT W-CA,FE,FA,<1 MG, (PRENATAL VITAMIN ORAL) Take by mouth.       "sertraline (ZOLOFT) 100 MG tablet TAKE 1 TABLET BY MOUTH EVERY DAY 90 tablet 3     No current facility-administered medications on file prior to visit.       OB History          2    Para   2    Term   2            AB        Living   2         SAB        IAB        Ectopic        Multiple   0    Live Births   2                 Social History     Socioeconomic History    Marital status:    Tobacco Use    Smoking status: Never    Smokeless tobacco: Never   Substance and Sexual Activity    Alcohol use: Not Currently     Comment: socially     Drug use: No    Sexual activity: Yes     Partners: Male     Birth control/protection: None       Family History   Problem Relation Age of Onset    Gestational diabetes Mother     Hypertension Father     Breast cancer Maternal Aunt     Breast cancer Maternal Grandmother     Heart murmur Maternal Grandfather     Glaucoma Paternal Grandmother     Diabetes Paternal Grandmother     Macular degeneration Neg Hx     Retinal detachment Neg Hx          Past medical and surgical history reviewed.   I have reviewed the patient's medical history in detail and updated the computerized patient record.        Review of System:   General: no chills, fever, night sweats, weight gain or weight loss  Psychological: no depression or suicidal ideation  Breasts: no new or changing breast lumps, nipple discharge or masses.  Respiratory: no cough, shortness of breath, or wheezing  Cardiovascular: no chest pain or dyspnea on exertion  Gastrointestinal: no abdominal pain, change in bowel habits, or black or bloody stools  Genito-Urinary: no incontinence, urinary frequency/urgency or vulvar/vaginal symptoms, pelvic pain or abnormal vaginal bleeding.  Musculoskeletal: no gait disturbance or muscular weakness      Physical Exam:   /62   Ht 5' 6" (1.676 m)   Wt 98.4 kg (216 lb 14.9 oz)   LMP 11/15/2023 (Approximate)   BMI 35.01 kg/m²   Constitutional: She appears alert and " responsive. She appears well-developed, well-groomed, and well-nourished. No distress. OverWeight   HENT:   Head: Normocephalic and atraumatic.   Eyes: Conjunctivae and EOM are normal. No scleral icterus.   Neck: Symmetrical. Normal range of motion. Neck supple. No tracheal deviation present.   Cardiovascular: Normal rate, no rhythm abnormality noted. Extremities without swelling or edema, warm.    Pulmonary/Chest: Normal respiratory Effort. No distress or retractions. She exhibits no tenderness.  Breasts: are symmetrical.   Right breast exhibits no inverted nipple, no mass, no nipple discharge, no skin change and no tenderness.   Left breast exhibits no inverted nipple, no mass, no nipple discharge, no skin change and no tenderness.  Abdominal: Soft. She exhibits no distension, hernias or masses. There is no tenderness. No enlargement of liver edge or spleen.  There is no rebound and no guarding.   Genitourinary:    External rectal exam shows no thrombosed external hemorrhoids, no lesions.     Pelvic exam was performed with patient supine.   No labial fusion, and symmetrical.    There is no rash, lesion or injury on the right labia.   There is no rash, lesion or injury on the left labia.   No bleeding and no signs of injury around the vaginal introitus, urethral meatus is normal size and without prolapse or lesions, urethra well supported. The cervix is visualized with no discharge, lesions or friability.   No vaginal discharge found.   No significant Cystocele, Enterocele or rectocele, and cervix and uterus well supported.   Bimanual exam:   The urethra is normal to palpation and there are no palpable vaginal wall masses.   Uterus is not deviated, not enlarged, not fixed, normal shape and not tender.   Cervix exhibits no motion tenderness.    Right adnexum displays no mass or nodularity and no tenderness.   Left adnexum displays no mass or nodularity and no tenderness.  Musculoskeletal: Normal range of motion.    Neurological: She is alert and oriented to person, place, and time. Coordination normal.   Skin: Skin is warm and dry. She is not diaphoretic. No rashes, lesions or ulcers.   Psychiatric: She has a normal mood and affect, oriented to person, place, and time.      Assessment:   Normal annual GYN exam  1. Gynecologic exam normal  Liquid-Based Pap Smear, Screening          Plan:   PAP  Mammogram- baseline  Follow up in 1 year.  Patient informed will be contacted with results within 2 weeks. Encouraged to please call back or email if she has not heard from us by then.

## 2023-12-03 LAB
FINAL PATHOLOGIC DIAGNOSIS: NORMAL
Lab: NORMAL

## 2023-12-04 ENCOUNTER — PATIENT MESSAGE (OUTPATIENT)
Dept: OBSTETRICS AND GYNECOLOGY | Facility: CLINIC | Age: 33
End: 2023-12-04
Payer: COMMERCIAL

## 2024-01-03 ENCOUNTER — HOSPITAL ENCOUNTER (OUTPATIENT)
Dept: RADIOLOGY | Facility: HOSPITAL | Age: 34
Discharge: HOME | End: 2024-01-03
Attending: OBSTETRICS & GYNECOLOGY
Payer: COMMERCIAL

## 2024-01-03 DIAGNOSIS — Z12.31 BREAST CANCER SCREENING BY MAMMOGRAM: ICD-10-CM

## 2024-01-03 PROCEDURE — 77063 BREAST TOMOSYNTHESIS BI: CPT | Mod: 26,,, | Performed by: RADIOLOGY

## 2024-01-03 PROCEDURE — 77067 SCR MAMMO BI INCL CAD: CPT | Mod: TC,PO

## 2024-01-03 PROCEDURE — 77067 SCR MAMMO BI INCL CAD: CPT | Mod: 26,,, | Performed by: RADIOLOGY

## 2024-01-05 ENCOUNTER — PATIENT MESSAGE (OUTPATIENT)
Dept: OBSTETRICS AND GYNECOLOGY | Facility: CLINIC | Age: 34
End: 2024-01-05
Payer: COMMERCIAL

## 2024-03-19 ENCOUNTER — PATIENT MESSAGE (OUTPATIENT)
Dept: OBSTETRICS AND GYNECOLOGY | Facility: CLINIC | Age: 34
End: 2024-03-19
Payer: COMMERCIAL

## 2024-03-19 DIAGNOSIS — R10.2 PELVIC PAIN: Primary | ICD-10-CM

## 2024-03-21 ENCOUNTER — HOSPITAL ENCOUNTER (OUTPATIENT)
Dept: RADIOLOGY | Facility: HOSPITAL | Age: 34
Discharge: HOME | End: 2024-03-21
Attending: OBSTETRICS & GYNECOLOGY
Payer: COMMERCIAL

## 2024-03-21 DIAGNOSIS — R10.2 PELVIC PAIN: ICD-10-CM

## 2024-03-21 LAB — HBA1C MFR BLD: 5.4 % (ref 4–6)

## 2024-03-21 PROCEDURE — 76856 US EXAM PELVIC COMPLETE: CPT | Mod: 26,,, | Performed by: RADIOLOGY

## 2024-03-21 PROCEDURE — 76830 TRANSVAGINAL US NON-OB: CPT | Mod: 26,,, | Performed by: RADIOLOGY

## 2024-03-21 PROCEDURE — 76830 TRANSVAGINAL US NON-OB: CPT | Mod: TC,PN

## 2024-03-25 ENCOUNTER — TELEPHONE (OUTPATIENT)
Dept: OBSTETRICS AND GYNECOLOGY | Facility: CLINIC | Age: 34
End: 2024-03-25
Payer: COMMERCIAL

## 2024-03-25 NOTE — TELEPHONE ENCOUNTER
----- Message from Jose Hood MD sent at 3/25/2024  9:00 AM CDT -----  Regarding: ultrasound  Please inform ultrasound results.    Persistent right sided tubular structure most consistent with blocked / enlarged fallopian tube, could cause pain on that right side on and off. Otherwise no significant findings or pathology for ovulatory female.    Please follow up if continued problems to discuss laparoscopy to remove that abnormal right tube if having pain.

## 2025-02-10 ENCOUNTER — OFFICE VISIT (OUTPATIENT)
Dept: OBSTETRICS AND GYNECOLOGY | Facility: CLINIC | Age: 35
End: 2025-02-10
Payer: COMMERCIAL

## 2025-02-10 VITALS
WEIGHT: 212.75 LBS | DIASTOLIC BLOOD PRESSURE: 70 MMHG | SYSTOLIC BLOOD PRESSURE: 122 MMHG | BODY MASS INDEX: 34.34 KG/M2

## 2025-02-10 DIAGNOSIS — Z01.419 WELL WOMAN EXAM: Primary | ICD-10-CM

## 2025-02-10 DIAGNOSIS — Z12.4 CERVICAL CANCER SCREENING: ICD-10-CM

## 2025-02-10 DIAGNOSIS — N94.10 DYSPAREUNIA IN FEMALE: ICD-10-CM

## 2025-02-10 PROCEDURE — 3008F BODY MASS INDEX DOCD: CPT | Mod: CPTII,S$GLB,,

## 2025-02-10 PROCEDURE — 3074F SYST BP LT 130 MM HG: CPT | Mod: CPTII,S$GLB,,

## 2025-02-10 PROCEDURE — 1159F MED LIST DOCD IN RCRD: CPT | Mod: CPTII,S$GLB,,

## 2025-02-10 PROCEDURE — 99999 PR PBB SHADOW E&M-EST. PATIENT-LVL IV: CPT | Mod: PBBFAC,,,

## 2025-02-10 PROCEDURE — 1160F RVW MEDS BY RX/DR IN RCRD: CPT | Mod: CPTII,S$GLB,,

## 2025-02-10 PROCEDURE — 88175 CYTOPATH C/V AUTO FLUID REDO: CPT

## 2025-02-10 PROCEDURE — 3078F DIAST BP <80 MM HG: CPT | Mod: CPTII,S$GLB,,

## 2025-02-10 PROCEDURE — 99395 PREV VISIT EST AGE 18-39: CPT | Mod: S$GLB,,,

## 2025-02-10 PROCEDURE — 4010F ACE/ARB THERAPY RXD/TAKEN: CPT | Mod: CPTII,S$GLB,,

## 2025-02-10 RX ORDER — TIRZEPATIDE 10 MG/.5ML
10 INJECTION, SOLUTION SUBCUTANEOUS
COMMUNITY

## 2025-02-10 RX ORDER — LOSARTAN POTASSIUM 25 MG/1
25 TABLET ORAL DAILY
COMMUNITY

## 2025-02-10 NOTE — PROGRESS NOTES
HISTORY OF PRESENT ILLNESS:    Frances Wikles is a 34 y.o. female, , Patient's last menstrual period was 2025 (approximate).,  presents for a routine annual exam .   Last pap:  - NILM    Last mammogram:  NL TC 19.9%. Recommendation to start annual screenings at age 38  Last colon ca screening:  n/a   SA: male partner, does not use condoms  Contraception: condoms.    Sexually transmitted infection risk: very low risk of STD exposure.   Menstrual flow: regular every 28-30 days.    She reports issues with pelvic pressure during intercourse. Pelvic US for same concern last  year. Revealed right hydrosalpinx, pelvic congestion syndrome, and Moderate volume of complex fluid within the pelvis, similar to the prior study. Recommend NSAIDs or trial of COCP.     This is the extent of the patient's complaints at this time.     Past Medical History:   Diagnosis Date    Abnormal Pap smear     Asthma     allergy induced    Gestational diabetes mellitus (GDM) in third trimester 2019    Hypertension     Maternal care due to low transverse uterine scar from previous  delivery     Ovarian cyst     Seasonal allergies        Past Surgical History:   Procedure Laterality Date     SECTION       SECTION N/A 2019    Procedure:  SECTION;  Surgeon: Jose Hood MD;  Location: [facility];  Service: OB/GYN;  Laterality: N/A;       MEDICATIONS AND ALLERGIES:      Current Outpatient Medications:     albuterol (PROVENTIL/VENTOLIN HFA) 90 mcg/actuation inhaler, Every 4 hours PRN BRING TO HOSPITAL; USE IN AM IF NEEDED, Disp: , Rfl:     losartan (COZAAR) 25 MG tablet, Take 25 mg by mouth once daily., Disp: , Rfl:     ONETOUCH ULTRA TEST Strp, TEST ONCE DAILY AND AS NEEDED, Disp: , Rfl:     ONETOUCH ULTRA2 METER Misc, TEST ONCE DAILY AND AS NEEDED, Disp: , Rfl:     ONETOUCH ULTRASOFT LANCETS lancets, TEST EVERY DAY AND AS NEEDED, Disp: , Rfl:     tirzepatide (MOUNJARO) 10 mg/0.5 mL  PnIj, Inject 10 mg into the skin every 7 days., Disp: , Rfl:     multivit with min-folic acid 200 mcg Chew, Take by mouth., Disp: , Rfl:     PRENATAL VIT W-CA,FE,FA,<1 MG, (PRENATAL VITAMIN ORAL), Take by mouth., Disp: , Rfl:     tirzepatide (MOUNJARO) 2.5 mg/0.5 mL PnIj, , Disp: , Rfl:     Review of patient's allergies indicates:   Allergen Reactions    No known allergies        Family History   Problem Relation Name Age of Onset    Gestational diabetes Mother      Hypertension Father      Breast cancer Maternal Aunt      Breast cancer Maternal Grandmother      Heart murmur Maternal Grandfather      Glaucoma Paternal Grandmother      Diabetes Paternal Grandmother      Macular degeneration Neg Hx      Retinal detachment Neg Hx         Social History     Socioeconomic History    Marital status:    Tobacco Use    Smoking status: Never    Smokeless tobacco: Never   Substance and Sexual Activity    Alcohol use: Not Currently     Comment: socially     Drug use: No    Sexual activity: Yes     Partners: Male     Birth control/protection: None       OB History    Para Term  AB Living   2 2 2     2   SAB IAB Ectopic Multiple Live Births         0 2      # Outcome Date GA Lbr [place]/2nd Weight Sex Type Anes PTL Lv   2 Term 19 39w0d  4.451 kg (9 lb 13 oz) M CS-LTranv Spinal N JEFFREY   1 Term 14 40w0d  3.912 kg (8 lb 10 oz) F CS-LTranv EPI N JEFFREY      Complications: Failure to Progress in First Stage          COMPREHENSIVE GYN HISTORY:  PAP History: Denies abnormal Paps.  Infection History: Denies STDs. Denies PID.  Benign History: Denies uterine fibroids. Denies ovarian cysts. Denies endometriosis. Denies other conditions.  Cancer History: Denies cervical cancer. Denies uterine cancer or hyperplasia. Denies ovarian cancer. Denies vulvar cancer or pre-cancer. Denies vaginal cancer or pre-cancer. Denies breast cancer. Denies colon cancer.      ROS:  GENERAL: No weight changes. No swelling. No fatigue.  No fever.  BREASTS: No pain. No lumps. No discharge.  ABDOMEN: No pain. No nausea. No vomiting. No diarrhea. No constipation.  REPRODUCTIVE: No abnormal bleeding. No pelvic pain.   VULVA: No pain. No lesions. No itching.  VAGINA: No relaxation. No itching. No odor. No discharge. No lesions.  URINARY: No incontinence. No nocturia. No frequency. No dysuria.    /70 (Patient Position: Sitting)   Wt 96.5 kg (212 lb 11.9 oz)   LMP 01/24/2025 (Approximate)   BMI 34.34 kg/m²     PE:  Physical Exam:   Constitutional: She is oriented to person, place, and time. She appears well-developed and well-nourished. No distress.    HENT:   Head: Normocephalic.    Eyes: Pupils are equal, round, and reactive to light.      Pulmonary/Chest: Effort normal. No respiratory distress. She exhibits no mass, no tenderness, no laceration, no edema, no deformity, no swelling and no retraction. Right breast exhibits no inverted nipple, no mass, no nipple discharge, no skin change, no tenderness, no bleeding and no swelling. Left breast exhibits no inverted nipple, no mass, no nipple discharge, no skin change, no tenderness, no bleeding and no swelling.        Abdominal: Soft. She exhibits no distension. There is no abdominal tenderness.     Genitourinary:    Inguinal canal, vagina, uterus, right adnexa and left adnexa normal.      Pelvic exam was performed with patient supine.   The external female genitalia was normal.     Labial bartholins normal.There is no rash, tenderness or lesion on the right labia. There is no rash, tenderness or lesion on the left labia. Cervix is normal. Right adnexum displays no mass, no tenderness and no fullness. Left adnexum displays no mass, no tenderness and no fullness. Vagina exhibits no lesion. No erythema, vaginal discharge, tenderness or bleeding in the vagina.    No signs of injury in the vagina.   Cervix exhibits no motion tenderness, no lesion, no discharge, no friability, no tenderness and no  polyp.    pap smear completedUterus consistancy normal and Uerus contour normal            Musculoskeletal: Normal range of motion and moves all extremeties.       Neurological: She is alert and oriented to person, place, and time.    Skin: Skin is warm and dry.    Psychiatric: She has a normal mood and affect. Judgment and thought content normal.        PROCEDURES/ORDERS:  Pap      Assessment/Plan:    Well woman exam  -     Liquid-Based Pap Smear, Screening    Dyspareunia in female  -     US Pelvis Comp with Transvag NON-OB (xpd; Future; Expected date: 02/10/2025    Cervical cancer screening  -     Liquid-Based Pap Smear, Screening        COUNSELING:  The patient was counseled today on:  -A.C.S. Pap and pelvic exam guidelines, recomendations for yearly mammogram, monthly self breast exams and to follow up with her PCP for other health maintenance.    FOLLOW-UP  annually for [employer].
